# Patient Record
Sex: FEMALE | Race: WHITE | HISPANIC OR LATINO | Employment: UNEMPLOYED | URBAN - METROPOLITAN AREA
[De-identification: names, ages, dates, MRNs, and addresses within clinical notes are randomized per-mention and may not be internally consistent; named-entity substitution may affect disease eponyms.]

---

## 2018-02-04 ENCOUNTER — HOSPITAL ENCOUNTER (OUTPATIENT)
Facility: HOSPITAL | Age: 53
Discharge: HOME | End: 2018-02-05
Attending: EMERGENCY MEDICINE | Admitting: HOSPITALIST
Payer: COMMERCIAL

## 2018-02-04 ENCOUNTER — ANESTHESIA EVENT (OUTPATIENT)
Dept: ENDOSCOPY | Facility: HOSPITAL | Age: 53
End: 2018-02-04
Payer: COMMERCIAL

## 2018-02-04 ENCOUNTER — ANESTHESIA (OUTPATIENT)
Dept: ENDOSCOPY | Facility: HOSPITAL | Age: 53
End: 2018-02-04
Payer: COMMERCIAL

## 2018-02-04 ENCOUNTER — TELEPHONE (OUTPATIENT)
Dept: GASTROENTEROLOGY | Facility: HOSPITAL | Age: 53
End: 2018-02-04

## 2018-02-04 DIAGNOSIS — R07.9 CHEST PAIN: ICD-10-CM

## 2018-02-04 DIAGNOSIS — R13.10 DYSPHAGIA, UNSPECIFIED TYPE: Primary | ICD-10-CM

## 2018-02-04 DIAGNOSIS — E11.9 TYPE 2 DIABETES MELLITUS WITHOUT COMPLICATION, WITH LONG-TERM CURRENT USE OF INSULIN: ICD-10-CM

## 2018-02-04 DIAGNOSIS — I10 ESSENTIAL HYPERTENSION: ICD-10-CM

## 2018-02-04 DIAGNOSIS — R13.19 ESOPHAGEAL DYSPHAGIA: ICD-10-CM

## 2018-02-04 DIAGNOSIS — Z79.4 TYPE 2 DIABETES MELLITUS WITHOUT COMPLICATION, WITH LONG-TERM CURRENT USE OF INSULIN: ICD-10-CM

## 2018-02-04 DIAGNOSIS — E07.9 THYROID DISEASE: ICD-10-CM

## 2018-02-04 LAB
POCT GLUCOSE: 148 MG/DL (ref 70–110)
POCT GLUCOSE: 185 MG/DL (ref 70–110)
TROPONIN I SERPL DL<=0.01 NG/ML-MCNC: <0.006 NG/ML

## 2018-02-04 PROCEDURE — 93005 ELECTROCARDIOGRAM TRACING: CPT

## 2018-02-04 PROCEDURE — 84484 ASSAY OF TROPONIN QUANT: CPT

## 2018-02-04 PROCEDURE — 88305 TISSUE EXAM BY PATHOLOGIST: CPT

## 2018-02-04 PROCEDURE — 82962 GLUCOSE BLOOD TEST: CPT | Performed by: INTERNAL MEDICINE

## 2018-02-04 PROCEDURE — 37000008 HC ANESTHESIA 1ST 15 MINUTES: Performed by: INTERNAL MEDICINE

## 2018-02-04 PROCEDURE — 88342 IMHCHEM/IMCYTCHM 1ST ANTB: CPT | Mod: 26,,,

## 2018-02-04 PROCEDURE — 82962 GLUCOSE BLOOD TEST: CPT

## 2018-02-04 PROCEDURE — D9220A PRA ANESTHESIA: Mod: CRNA,,, | Performed by: NURSE ANESTHETIST, CERTIFIED REGISTERED

## 2018-02-04 PROCEDURE — 27201012 HC FORCEPS, HOT/COLD, DISP: Performed by: INTERNAL MEDICINE

## 2018-02-04 PROCEDURE — 25000003 PHARM REV CODE 250: Performed by: NURSE ANESTHETIST, CERTIFIED REGISTERED

## 2018-02-04 PROCEDURE — 93010 ELECTROCARDIOGRAM REPORT: CPT | Mod: ,,, | Performed by: INTERNAL MEDICINE

## 2018-02-04 PROCEDURE — 96372 THER/PROPH/DIAG INJ SC/IM: CPT

## 2018-02-04 PROCEDURE — 99220 PR INITIAL OBSERVATION CARE,LEVL III: CPT | Mod: ,,, | Performed by: PHYSICIAN ASSISTANT

## 2018-02-04 PROCEDURE — 37000009 HC ANESTHESIA EA ADD 15 MINS: Performed by: INTERNAL MEDICINE

## 2018-02-04 PROCEDURE — 99243 OFF/OP CNSLTJ NEW/EST LOW 30: CPT | Mod: 25,,, | Performed by: INTERNAL MEDICINE

## 2018-02-04 PROCEDURE — 63600175 PHARM REV CODE 636 W HCPCS: Performed by: NURSE ANESTHETIST, CERTIFIED REGISTERED

## 2018-02-04 PROCEDURE — G0378 HOSPITAL OBSERVATION PER HR: HCPCS

## 2018-02-04 PROCEDURE — 43239 EGD BIOPSY SINGLE/MULTIPLE: CPT | Performed by: INTERNAL MEDICINE

## 2018-02-04 PROCEDURE — 99285 EMERGENCY DEPT VISIT HI MDM: CPT | Mod: ,,, | Performed by: EMERGENCY MEDICINE

## 2018-02-04 PROCEDURE — 99285 EMERGENCY DEPT VISIT HI MDM: CPT | Mod: 25

## 2018-02-04 PROCEDURE — D9220A PRA ANESTHESIA: Mod: ANES,,, | Performed by: ANESTHESIOLOGY

## 2018-02-04 PROCEDURE — 43239 EGD BIOPSY SINGLE/MULTIPLE: CPT | Mod: 22,,, | Performed by: INTERNAL MEDICINE

## 2018-02-04 PROCEDURE — 63600175 PHARM REV CODE 636 W HCPCS: Performed by: STUDENT IN AN ORGANIZED HEALTH CARE EDUCATION/TRAINING PROGRAM

## 2018-02-04 PROCEDURE — 96374 THER/PROPH/DIAG INJ IV PUSH: CPT

## 2018-02-04 PROCEDURE — 88305 TISSUE EXAM BY PATHOLOGIST: CPT | Mod: 26,,,

## 2018-02-04 RX ORDER — LIDOCAINE HCL/PF 100 MG/5ML
SYRINGE (ML) INTRAVENOUS
Status: DISCONTINUED | OUTPATIENT
Start: 2018-02-04 | End: 2018-02-04

## 2018-02-04 RX ORDER — ONDANSETRON 2 MG/ML
4 INJECTION INTRAMUSCULAR; INTRAVENOUS ONCE
Status: COMPLETED | OUTPATIENT
Start: 2018-02-04 | End: 2018-02-04

## 2018-02-04 RX ORDER — FENTANYL CITRATE 50 UG/ML
25 INJECTION, SOLUTION INTRAMUSCULAR; INTRAVENOUS EVERY 5 MIN PRN
Status: DISCONTINUED | OUTPATIENT
Start: 2018-02-04 | End: 2018-02-05 | Stop reason: HOSPADM

## 2018-02-04 RX ORDER — GLUCAGON 1 MG
1 KIT INJECTION ONCE
Status: COMPLETED | OUTPATIENT
Start: 2018-02-04 | End: 2018-02-04

## 2018-02-04 RX ORDER — SODIUM CHLORIDE 9 MG/ML
INJECTION, SOLUTION INTRAVENOUS CONTINUOUS PRN
Status: DISCONTINUED | OUTPATIENT
Start: 2018-02-04 | End: 2018-02-04

## 2018-02-04 RX ORDER — HYOSCYAMINE SULFATE 0.12 MG/1
0.12 TABLET SUBLINGUAL
Status: COMPLETED | OUTPATIENT
Start: 2018-02-04 | End: 2018-02-04

## 2018-02-04 RX ORDER — ONDANSETRON 2 MG/ML
4 INJECTION INTRAMUSCULAR; INTRAVENOUS DAILY PRN
Status: DISCONTINUED | OUTPATIENT
Start: 2018-02-04 | End: 2018-02-05 | Stop reason: HOSPADM

## 2018-02-04 RX ORDER — SODIUM CHLORIDE 0.9 % (FLUSH) 0.9 %
3 SYRINGE (ML) INJECTION
Status: DISCONTINUED | OUTPATIENT
Start: 2018-02-05 | End: 2018-02-05 | Stop reason: HOSPADM

## 2018-02-04 RX ORDER — PROPOFOL 10 MG/ML
VIAL (ML) INTRAVENOUS
Status: DISCONTINUED | OUTPATIENT
Start: 2018-02-04 | End: 2018-02-04

## 2018-02-04 RX ADMIN — GLUCAGON 1 MG: KIT at 08:02

## 2018-02-04 RX ADMIN — LIDOCAINE HYDROCHLORIDE 100 MG: 20 INJECTION, SOLUTION INTRAVENOUS at 10:02

## 2018-02-04 RX ADMIN — ONDANSETRON 4 MG: 2 INJECTION INTRAMUSCULAR; INTRAVENOUS at 08:02

## 2018-02-04 RX ADMIN — SODIUM CHLORIDE: 0.9 INJECTION, SOLUTION INTRAVENOUS at 10:02

## 2018-02-04 RX ADMIN — HYOSCYAMINE SULFATE 0.12 MG: 0.12 TABLET ORAL; SUBLINGUAL at 08:02

## 2018-02-04 RX ADMIN — PROPOFOL 180 MG: 10 INJECTION, EMULSION INTRAVENOUS at 10:02

## 2018-02-04 NOTE — LETTER
February 5, 2018         [address]  [facility] [address]  Phone: [phone]  Fax: [phone]       Patient: Dianna Perez   YOB: 1965  Date of Visit: 02/05/2018    To Whom It May Concern:    Felix Perez was at [facility] on 02/04/2018-02/05/2018 while his wife was hospitalized.  If you have any questions or concerns, or if I can be of further assistance, please do not hesitate to contact me.    Sincerely,    Citlalli Epstein PA-C

## 2018-02-04 NOTE — ED TRIAGE NOTES
Pt arrives to the ED with CC of difficulty swallowing. Pt's grand daughter stated that the pt has had a history with this before 17 times prior to this ED visit. Pt's grand daughter stated that the pt has had endoscopy in order to re-open the esophagus. Pt denies any NVD and fever. Pt is alert and oriented and in no acute distress at this time.

## 2018-02-04 NOTE — LETTER
February 5, 2018         [address]  [facility] [address]  Phone: [phone]  Fax: [phone]       Patient: Dianna Perez   YOB: 1965  Date of Visit: 02/05/2018    To Whom It May Concern:    Felix Perez was at [facility] on 02/04/2018-02/05/2018 while his wife was hospitalized.    Sincerely,    Citlalli Epstein PA-C

## 2018-02-04 NOTE — LETTER
February 5, 2018                         [address]  [facility] [address]  Phone: [phone]  Fax: [phone]   February 5, 2018     Patient: Dianna Perez   YOB: 1965   Date of Visit: 2/4/2018       To Whom it May Concern:    Dianna Fong stayed at [facility] on 2/4/2018-2/5/2018 while her grandmother, Dianna Perez, was hospitalized.    If you have any questions or concerns, please don't hesitate to call.    Sincerely,         Citlalli Epstein PA-C

## 2018-02-04 NOTE — LETTER
February 5, 2018         [address]  [place] [address]  Phone: [phone]  Fax: [phone]       Patient: Dianna Perez   YOB: 1965  Date of Visit: 02/05/2018    To Whom It May Concern:    Dianna Fong was at [facility] on 02/04/2018-02/05/2018 while her grandmother, Dianna Perez, was hospitalized. If you have any questions or concerns, or if I can be of further assistance, please do not hesitate to contact me.    Sincerely,    Citlalli Epstein PA-C

## 2018-02-05 ENCOUNTER — TELEPHONE (OUTPATIENT)
Dept: GASTROENTEROLOGY | Facility: HOSPITAL | Age: 53
End: 2018-02-05

## 2018-02-05 VITALS
HEART RATE: 72 BPM | WEIGHT: 226 LBS | RESPIRATION RATE: 16 BRPM | BODY MASS INDEX: 38.58 KG/M2 | OXYGEN SATURATION: 95 % | DIASTOLIC BLOOD PRESSURE: 62 MMHG | SYSTOLIC BLOOD PRESSURE: 109 MMHG | HEIGHT: 64 IN | TEMPERATURE: 97 F

## 2018-02-05 PROBLEM — R13.19 ESOPHAGEAL DYSPHAGIA: Status: ACTIVE | Noted: 2018-02-05

## 2018-02-05 PROBLEM — E78.2 MIXED HYPERLIPIDEMIA: Chronic | Status: ACTIVE | Noted: 2018-02-05

## 2018-02-05 LAB
ALBUMIN SERPL BCP-MCNC: 3 G/DL
ALP SERPL-CCNC: 128 U/L
ALT SERPL W/O P-5'-P-CCNC: 54 U/L
ANION GAP SERPL CALC-SCNC: 8 MMOL/L
AST SERPL-CCNC: 41 U/L
BASOPHILS # BLD AUTO: 0.03 K/UL
BASOPHILS NFR BLD: 0.3 %
BILIRUB SERPL-MCNC: 0.8 MG/DL
BUN SERPL-MCNC: 12 MG/DL
CALCIUM SERPL-MCNC: 8.6 MG/DL
CHLORIDE SERPL-SCNC: 106 MMOL/L
CO2 SERPL-SCNC: 24 MMOL/L
CREAT SERPL-MCNC: 0.8 MG/DL
DIFFERENTIAL METHOD: NORMAL
EOSINOPHIL # BLD AUTO: 0.1 K/UL
EOSINOPHIL NFR BLD: 0.9 %
ERYTHROCYTE [DISTWIDTH] IN BLOOD BY AUTOMATED COUNT: 13.8 %
EST. GFR  (AFRICAN AMERICAN): >60 ML/MIN/1.73 M^2
EST. GFR  (NON AFRICAN AMERICAN): >60 ML/MIN/1.73 M^2
ESTIMATED AVG GLUCOSE: 237 MG/DL
GLUCOSE SERPL-MCNC: 224 MG/DL
HBA1C MFR BLD HPLC: 9.9 %
HCT VFR BLD AUTO: 39.7 %
HGB BLD-MCNC: 13 G/DL
IMM GRANULOCYTES # BLD AUTO: 0.02 K/UL
IMM GRANULOCYTES NFR BLD AUTO: 0.2 %
LYMPHOCYTES # BLD AUTO: 2.4 K/UL
LYMPHOCYTES NFR BLD: 27.9 %
MAGNESIUM SERPL-MCNC: 2 MG/DL
MCH RBC QN AUTO: 27 PG
MCHC RBC AUTO-ENTMCNC: 32.7 G/DL
MCV RBC AUTO: 82 FL
MONOCYTES # BLD AUTO: 0.4 K/UL
MONOCYTES NFR BLD: 4.5 %
NEUTROPHILS # BLD AUTO: 5.8 K/UL
NEUTROPHILS NFR BLD: 66.2 %
NRBC BLD-RTO: 0 /100 WBC
PHOSPHATE SERPL-MCNC: 3.1 MG/DL
PLATELET # BLD AUTO: 173 K/UL
PMV BLD AUTO: 12.6 FL
POCT GLUCOSE: 245 MG/DL (ref 70–110)
POCT GLUCOSE: 289 MG/DL (ref 70–110)
POTASSIUM SERPL-SCNC: 4 MMOL/L
PROT SERPL-MCNC: 6.7 G/DL
RBC # BLD AUTO: 4.82 M/UL
SODIUM SERPL-SCNC: 138 MMOL/L
WBC # BLD AUTO: 8.72 K/UL

## 2018-02-05 PROCEDURE — 25000003 PHARM REV CODE 250: Performed by: PHYSICIAN ASSISTANT

## 2018-02-05 PROCEDURE — 85025 COMPLETE CBC W/AUTO DIFF WBC: CPT

## 2018-02-05 PROCEDURE — 83735 ASSAY OF MAGNESIUM: CPT

## 2018-02-05 PROCEDURE — 36415 COLL VENOUS BLD VENIPUNCTURE: CPT

## 2018-02-05 PROCEDURE — G0378 HOSPITAL OBSERVATION PER HR: HCPCS

## 2018-02-05 PROCEDURE — 84100 ASSAY OF PHOSPHORUS: CPT

## 2018-02-05 PROCEDURE — 63600175 PHARM REV CODE 636 W HCPCS: Performed by: PHYSICIAN ASSISTANT

## 2018-02-05 PROCEDURE — 99217 PR OBSERVATION CARE DISCHARGE: CPT | Mod: ,,, | Performed by: PHYSICIAN ASSISTANT

## 2018-02-05 PROCEDURE — 25000242 PHARM REV CODE 250 ALT 637 W/ HCPCS: Performed by: PHYSICIAN ASSISTANT

## 2018-02-05 PROCEDURE — 80053 COMPREHEN METABOLIC PANEL: CPT

## 2018-02-05 PROCEDURE — 83036 HEMOGLOBIN GLYCOSYLATED A1C: CPT

## 2018-02-05 RX ORDER — SODIUM CHLORIDE 0.9 % (FLUSH) 0.9 %
5 SYRINGE (ML) INJECTION
Status: DISCONTINUED | OUTPATIENT
Start: 2018-02-05 | End: 2018-02-05 | Stop reason: HOSPADM

## 2018-02-05 RX ORDER — FAMOTIDINE 20 MG/1
20 TABLET, FILM COATED ORAL 2 TIMES DAILY
Status: DISCONTINUED | OUTPATIENT
Start: 2018-02-05 | End: 2018-02-05 | Stop reason: HOSPADM

## 2018-02-05 RX ORDER — RAMELTEON 8 MG/1
8 TABLET ORAL NIGHTLY PRN
Status: DISCONTINUED | OUTPATIENT
Start: 2018-02-05 | End: 2018-02-05 | Stop reason: HOSPADM

## 2018-02-05 RX ORDER — GLUCAGON 1 MG
1 KIT INJECTION
Status: DISCONTINUED | OUTPATIENT
Start: 2018-02-05 | End: 2018-02-05 | Stop reason: HOSPADM

## 2018-02-05 RX ORDER — HYOSCYAMINE SULFATE 0.12 MG/1
0.12 TABLET SUBLINGUAL EVERY 4 HOURS PRN
Status: DISCONTINUED | OUTPATIENT
Start: 2018-02-05 | End: 2018-02-05 | Stop reason: HOSPADM

## 2018-02-05 RX ORDER — INSULIN ASPART 100 [IU]/ML
0-5 INJECTION, SOLUTION INTRAVENOUS; SUBCUTANEOUS EVERY 6 HOURS PRN
Status: DISCONTINUED | OUTPATIENT
Start: 2018-02-05 | End: 2018-02-05 | Stop reason: HOSPADM

## 2018-02-05 RX ORDER — IBUPROFEN 200 MG
24 TABLET ORAL
Status: DISCONTINUED | OUTPATIENT
Start: 2018-02-05 | End: 2018-02-05 | Stop reason: HOSPADM

## 2018-02-05 RX ORDER — MORPHINE SULFATE 20 MG/ML
5 SOLUTION ORAL EVERY 6 HOURS PRN
Status: DISCONTINUED | OUTPATIENT
Start: 2018-02-05 | End: 2018-02-05

## 2018-02-05 RX ORDER — IPRATROPIUM BROMIDE AND ALBUTEROL SULFATE 2.5; .5 MG/3ML; MG/3ML
3 SOLUTION RESPIRATORY (INHALATION) EVERY 4 HOURS PRN
Status: DISCONTINUED | OUTPATIENT
Start: 2018-02-05 | End: 2018-02-05 | Stop reason: HOSPADM

## 2018-02-05 RX ORDER — ACETAMINOPHEN 325 MG/1
650 TABLET ORAL EVERY 4 HOURS PRN
Status: DISCONTINUED | OUTPATIENT
Start: 2018-02-05 | End: 2018-02-05 | Stop reason: HOSPADM

## 2018-02-05 RX ORDER — PANTOPRAZOLE SODIUM 40 MG/1
40 TABLET, DELAYED RELEASE ORAL DAILY
Status: DISCONTINUED | OUTPATIENT
Start: 2018-02-05 | End: 2018-02-05 | Stop reason: HOSPADM

## 2018-02-05 RX ORDER — PRAVASTATIN SODIUM 10 MG/1
10 TABLET ORAL DAILY
Status: DISCONTINUED | OUTPATIENT
Start: 2018-02-05 | End: 2018-02-05 | Stop reason: HOSPADM

## 2018-02-05 RX ORDER — CALCIUM CARBONATE 200(500)MG
1 TABLET,CHEWABLE ORAL DAILY
Status: DISCONTINUED | OUTPATIENT
Start: 2018-02-05 | End: 2018-02-05 | Stop reason: HOSPADM

## 2018-02-05 RX ORDER — CITALOPRAM 40 MG/1
40 TABLET, FILM COATED ORAL DAILY
Status: DISCONTINUED | OUTPATIENT
Start: 2018-02-05 | End: 2018-02-05 | Stop reason: HOSPADM

## 2018-02-05 RX ORDER — AMOXICILLIN 250 MG
1 CAPSULE ORAL 2 TIMES DAILY
Status: DISCONTINUED | OUTPATIENT
Start: 2018-02-05 | End: 2018-02-05 | Stop reason: HOSPADM

## 2018-02-05 RX ORDER — PROMETHAZINE HYDROCHLORIDE 25 MG/1
25 TABLET ORAL EVERY 6 HOURS PRN
Status: DISCONTINUED | OUTPATIENT
Start: 2018-02-05 | End: 2018-02-05 | Stop reason: HOSPADM

## 2018-02-05 RX ORDER — MORPHINE SULFATE ORAL SOLUTION 10 MG/5ML
5 SOLUTION ORAL EVERY 6 HOURS PRN
Status: DISCONTINUED | OUTPATIENT
Start: 2018-02-05 | End: 2018-02-05 | Stop reason: HOSPADM

## 2018-02-05 RX ORDER — IBUPROFEN 200 MG
16 TABLET ORAL
Status: DISCONTINUED | OUTPATIENT
Start: 2018-02-05 | End: 2018-02-05 | Stop reason: HOSPADM

## 2018-02-05 RX ORDER — LISINOPRIL AND HYDROCHLOROTHIAZIDE 10; 12.5 MG/1; MG/1
1 TABLET ORAL DAILY
Status: DISCONTINUED | OUTPATIENT
Start: 2018-02-05 | End: 2018-02-05 | Stop reason: HOSPADM

## 2018-02-05 RX ORDER — FLUTICASONE PROPIONATE 50 MCG
1 SPRAY, SUSPENSION (ML) NASAL DAILY
Status: DISCONTINUED | OUTPATIENT
Start: 2018-02-05 | End: 2018-02-05 | Stop reason: HOSPADM

## 2018-02-05 RX ORDER — LEVOTHYROXINE SODIUM 25 UG/1
25 TABLET ORAL DAILY
Status: DISCONTINUED | OUTPATIENT
Start: 2018-02-05 | End: 2018-02-05 | Stop reason: HOSPADM

## 2018-02-05 RX ORDER — ONDANSETRON 2 MG/ML
4 INJECTION INTRAMUSCULAR; INTRAVENOUS EVERY 8 HOURS PRN
Status: DISCONTINUED | OUTPATIENT
Start: 2018-02-05 | End: 2018-02-05 | Stop reason: HOSPADM

## 2018-02-05 RX ADMIN — STANDARDIZED SENNA CONCENTRATE AND DOCUSATE SODIUM 1 TABLET: 8.6; 5 TABLET, FILM COATED ORAL at 08:02

## 2018-02-05 RX ADMIN — LISINOPRIL AND HYDROCHLOROTHIAZIDE 1 TABLET: 12.5; 1 TABLET ORAL at 08:02

## 2018-02-05 RX ADMIN — INSULIN ASPART 2 UNITS: 100 INJECTION, SOLUTION INTRAVENOUS; SUBCUTANEOUS at 08:02

## 2018-02-05 RX ADMIN — PRAVASTATIN SODIUM 10 MG: 10 TABLET ORAL at 08:02

## 2018-02-05 RX ADMIN — CITALOPRAM HYDROBROMIDE 40 MG: 40 TABLET ORAL at 08:02

## 2018-02-05 RX ADMIN — CALCIUM CARBONATE (ANTACID) CHEW TAB 500 MG 500 MG: 500 CHEW TAB at 08:02

## 2018-02-05 RX ADMIN — MORPHINE SULFATE 5 MG: 10 SOLUTION ORAL at 01:02

## 2018-02-05 RX ADMIN — MORPHINE SULFATE 5 MG: 10 SOLUTION ORAL at 07:02

## 2018-02-05 RX ADMIN — FLUTICASONE PROPIONATE 50 MCG: 50 SPRAY, METERED NASAL at 08:02

## 2018-02-05 RX ADMIN — LEVOTHYROXINE SODIUM 25 MCG: 25 TABLET ORAL at 08:02

## 2018-02-05 RX ADMIN — FAMOTIDINE 20 MG: 20 TABLET, FILM COATED ORAL at 08:02

## 2018-02-05 RX ADMIN — PANTOPRAZOLE SODIUM 40 MG: 40 TABLET, DELAYED RELEASE ORAL at 08:02

## 2018-02-05 RX ADMIN — ACETAMINOPHEN 650 MG: 325 TABLET, FILM COATED ORAL at 01:02

## 2018-02-05 RX ADMIN — INSULIN ASPART 3 UNITS: 100 INJECTION, SOLUTION INTRAVENOUS; SUBCUTANEOUS at 01:02

## 2018-02-05 NOTE — SUBJECTIVE & OBJECTIVE
Past Medical History:   Diagnosis Date    Asthma     Diabetes mellitus     Hypertension     Migraine headache     Stroke     Thyroid disease        Past Surgical History:   Procedure Laterality Date    APPENDECTOMY       SECTION      CHOLECYSTECTOMY      HYSTERECTOMY      UMBILICAL HERNIA REPAIR         Review of patient's allergies indicates:   Allergen Reactions    Diazepam Hallucinations       Current Facility-Administered Medications on File Prior to Encounter   Medication    [COMPLETED] GI cocktail (mylanta 30 mL, lidocaine 2 % viscous 10 mL, dicyclomine 10 mL) 50 mL    [COMPLETED] glucagon (human recombinant) injection 1 mg    [COMPLETED] ondansetron injection 4 mg     Current Outpatient Prescriptions on File Prior to Encounter   Medication Sig    lisinopril-hydrochlorothiazide (PRINZIDE,ZESTORETIC) 10-12.5 mg per tablet Take 1 tablet by mouth once daily.    ranitidine (ZANTAC) 150 MG tablet Take 150 mg by mouth 2 (two) times daily.    calcium carbonate (TUMS) 200 mg calcium (500 mg) chewable tablet Take 1 tablet by mouth once daily.    citalopram (CELEXA) 40 MG tablet Take 40 mg by mouth once daily.    codeine-butalbital-ASA-caffeine (BUTALBITAL COMPOUND-CODEINE) 22--40 mg Cap Take 1 capsule by mouth every 4 (four) hours as needed.    esomeprazole (NEXIUM) 20 MG capsule Take 20 mg by mouth before breakfast.    fluticasone (FLONASE) 50 mcg/actuation nasal spray 1 spray by Each Nare route once daily.    glyBURIDE (DIABETA) 5 MG tablet Take 5 mg by mouth daily with breakfast.    hyoscyamine (LEVSIN/SL) 0.125 mg Subl Place 0.125 mg under the tongue every 4 (four) hours as needed.    levothyroxine (SYNTHROID) 25 MCG tablet Take 25 mcg by mouth once daily.    metFORMIN (GLUCOPHAGE) 850 MG tablet Take 850 mg by mouth 2 (two) times daily with meals.    pantoprazole (PROTONIX) 40 MG tablet Take 40 mg by mouth once daily.    pravastatin (PRAVACHOL) 10 MG tablet Take 10 mg by  mouth once daily.    prochlorperazine (COMPAZINE) 10 MG tablet Take 1 tablet (10 mg total) by mouth 3 (three) times daily as needed.     Family History     None        Social History Main Topics    Smoking status: Never Smoker    Smokeless tobacco: Never Used    Alcohol use No    Drug use: No    Sexual activity: No     Review of Systems   Constitutional: Negative for activity change, appetite change, chills, fatigue, fever and unexpected weight change.   HENT: Positive for sore throat and trouble swallowing. Negative for congestion, rhinorrhea and voice change.    Eyes: Negative for visual disturbance.   Respiratory: Negative for cough, choking, chest tightness, shortness of breath and wheezing.    Cardiovascular: Negative for chest pain, palpitations and leg swelling.   Gastrointestinal: Negative for abdominal distention, abdominal pain, anal bleeding, blood in stool, constipation, diarrhea, nausea and vomiting.   Endocrine: Negative for cold intolerance, heat intolerance, polydipsia and polyuria.   Genitourinary: Negative for dysuria, flank pain, frequency, hematuria and urgency.   Musculoskeletal: Negative for arthralgias, back pain, joint swelling and myalgias.   Skin: Negative for color change and rash.   Neurological: Negative for dizziness, seizures, syncope, facial asymmetry, speech difficulty, weakness, light-headedness, numbness and headaches.   Hematological: Negative for adenopathy. Does not bruise/bleed easily.   Psychiatric/Behavioral: Negative for agitation, confusion, hallucinations and suicidal ideas.     Objective:     Vital Signs (Most Recent):  Temp: 97.2 °F (36.2 °C) (02/05/18 0027)  Pulse: 77 (02/05/18 0027)  Resp: 16 (02/05/18 0027)  BP: 135/67 (02/05/18 0027)  SpO2: 95 % (02/05/18 0027) Vital Signs (24h Range):  Temp:  [97.2 °F (36.2 °C)-98.6 °F (37 °C)] 97.2 °F (36.2 °C)  Pulse:  [] 77  Resp:  [16-20] 16  SpO2:  [94 %-99 %] 95 %  BP: (133-168)/() 135/67     Weight: 102.5 kg  (226 lb)  Body mass index is 38.79 kg/m².    Physical Exam   Constitutional: She is oriented to person, place, and time. She appears well-developed and well-nourished. No distress.   HENT:   Head: Normocephalic and atraumatic.   Neck: Neck supple. Carotid bruit is not present. No thyromegaly present.   Cardiovascular: Normal rate and regular rhythm.  Exam reveals no gallop.    No murmur heard.  Pulmonary/Chest: Effort normal and breath sounds normal. No respiratory distress. She has no wheezes.   Abdominal: Soft. Bowel sounds are normal. She exhibits no distension and no mass. There is no splenomegaly or hepatomegaly. There is no tenderness.   Musculoskeletal: Normal range of motion. She exhibits no edema or deformity.   Neurological: She is alert and oriented to person, place, and time. No cranial nerve deficit or sensory deficit.   Skin: Skin is warm and dry. No rash noted.   Psychiatric: She has a normal mood and affect.           Significant Labs:   CBC:   Recent Labs  Lab 02/04/18  1249   WBC 7.26   HGB 14.7   HCT 43.8        CMP:   Recent Labs  Lab 02/04/18  1249      K 3.9      CO2 29   *   BUN 12   CREATININE 0.66   CALCIUM 9.3   PROT 7.8   ALBUMIN 4.3   BILITOT 0.5   ALKPHOS 176*   AST 82*   ALT 98*   ANIONGAP 8   EGFRNONAA >60.0       Significant Imaging: I have reviewed all pertinent imaging results/findings within the past 24 hours.

## 2018-02-05 NOTE — ANESTHESIA PREPROCEDURE EVALUATION
2018  Dianna Perez is a 52 y.o., female with PMHx of HTN, DMII, hypothyroidism and history of multiple esophageal dilations presents for repeat scope due to inability to tolerate liquids or solids.    Pre-operative evaluation for ESOPHAGOGASTRODUODENOSCOPY (EGD) (N/A)    LDA:  18G L AC    Past Surgical History:   Procedure Laterality Date    APPENDECTOMY       SECTION      CHOLECYSTECTOMY      HYSTERECTOMY      UMBILICAL HERNIA REPAIR           Vital Signs Range (Last 24H):  Temp:  [36.7 °C (98 °F)-37 °C (98.6 °F)]   Pulse:  [71-96]   Resp:  [16-20]   BP: (133-168)/()   SpO2:  [96 %-99 %]       CBC:     Recent Labs  Lab 18  1249   WBC 7.26   RBC 5.46*   HGB 14.7   HCT 43.8      MCV 80*   MCH 26.9*   MCHC 33.6       CMP:   Recent Labs  Lab 18  1249      K 3.9      CO2 29   BUN 12   CREATININE 0.66   *   CALCIUM 9.3   ALBUMIN 4.3   PROT 7.8   ALKPHOS 176*   ALT 98*   AST 82*   BILITOT 0.5       INR:    Recent Labs  Lab 18  1249   INR 1.0   APTT 31.6     Anesthesia Evaluation    I have reviewed the Patient Summary Reports.     I have reviewed the Medications.     Review of Systems  Anesthesia Hx:  No problems with previous Anesthesia    Social:  Non-Smoker    Cardiovascular:   Hypertension Denies MI.  Denies CAD.    Denies CABG/stent.     Pulmonary:   Denies Pneumonia Denies COPD.    Renal/:  Renal/ Normal     Hepatic/GI:  Hepatic/GI Normal    Neurological:   CVA (residual L sided weakness), residual symptoms Denies Seizures.    Endocrine:   Diabetes        Physical Exam  General:  Well nourished, Obesity    Airway/Jaw/Neck:  Airway Findings: Mouth Opening: Normal Tongue: Large  Mallampati: III  Improves to III with phonation.  TM Distance: Normal, at least 6 cm  Jaw/Neck Findings:  Neck ROM: Normal Extension, Painful      Eyes/Ears/Nose:  EYES/EARS/NOSE FINDINGS: Normal   Dental:  Dental Findings: upper partial dentures   Chest/Lungs:  Chest/Lungs Findings: Normal Respiratory Rate     Heart/Vascular:  Heart Findings: Rate: Normal  Rhythm: Regular Rhythm        Mental Status:  Mental Status Findings: Normal        Anesthesia Plan  Type of Anesthesia, risks & benefits discussed:  Anesthesia Type:  general  Patient's Preference:   Intra-op Monitoring Plan: standard ASA monitors  Intra-op Monitoring Plan Comments:   Post Op Pain Control Plan:   Post Op Pain Control Plan Comments:   Induction:   IV  Beta Blocker:  Patient is not currently on a Beta-Blocker (No further documentation required).       Informed Consent: Patient understands risks and agrees with Anesthesia plan.  Questions answered. Anesthesia consent signed with patient.  ASA Score: 3     Day of Surgery Review of History & Physical:    H&P update referred to the surgeon.         Ready For Surgery From Anesthesia Perspective.

## 2018-02-05 NOTE — ASSESSMENT & PLAN NOTE
- Patient s/p EGD.  Procedure noted normal examined duodenum, eythematous mucosa in the gastric fundus, gastric body, antrum, prepyloric region of the stomach-biopsied, 3 cm hiatal hernia, and tortuous esophagus. One gastric polyp-biopsied.  - FLD per GI recommendations.  They will preform esophagram with 13mm barium table tomorrow.  - Will need follow-up in GI clinic in two weeks.  - Supportive care, aspiration precautions.

## 2018-02-05 NOTE — ASSESSMENT & PLAN NOTE
52 year old female with a history of HTN, Hypothyroidism, T2DM, and Asthma who was transferred from an outside hospital due to ongoing dysphagia. GI consulted to evaluate dysphagia and need for scope.    Patient reports symptom onset as last night, however delayed care as she was hopeful that symptoms would pass. Unsure if there is still a food bolus as VS are stable and she isn't having excessive secretions. However at OSH unable to pass a liquid channel and here with simply asking her to swallow saliva she started coughing/dry heaving (no emesis). A second dose of glucagon was give at 8:15 pm with no improvement in her symptoms. She overall feels like she is getting worse as due to the prolonged duration she now feels weak and has a headache.    Recommendations:  -NPO with IVF for EGD today further recs after EGD

## 2018-02-05 NOTE — H&P
[facility] Hospital Medicine  History & Physical    Patient Name: Dianna Perez  MRN: [id number]  Admission Date: 2018  Attending Physician: Fernando Cordova MD   Primary Care Provider: Primary Doctor [facility] Medicine Team: [facility] MED F Citlalli Epstein PA-C     Patient information was obtained from patient, relative(s) and ER records.     Subjective:     Principal Problem:Esophageal dysphagia    Chief Complaint:   Chief Complaint   Patient presents with    swallow problem     tx from [place].  Pt has difficulty swallowing since last night.  Here for ENT consult. Martiniquais Speaking.         HPI: Patient is a 52 year old lady with a h/o DM II, HTN, HLD, CVA, migraines, and multiple esophageal dilations.  She presents with difficulty swallowing.  This has gotten worse throughout the day.  She was seen at an OSH and transferred her for GI consult/EGD.  Patient first noticed symptoms yesterday at 19:00 after eating beef and beans.  She began having substernal burning, belching, and sensation of food bolus in her mid-esophagus.  She was unable to tolerate fluids.  She received Zofran, glucagon, and GI cocktail at OSH prior to transfer.  SHe was seen by GI, who took her for an EGD.  Patient currently complains of throat pain.  Denies chest pain, SOB, dizziness, palpitations, fever/chills, N/V.  She is able to swallow her secretions.  Granddaughter at bedside assists with translation, as patient is Martiniquais speaking.    Past Medical History:   Diagnosis Date    Asthma     Diabetes mellitus     Hypertension     Migraine headache     Stroke     Thyroid disease        Past Surgical History:   Procedure Laterality Date    APPENDECTOMY       SECTION      CHOLECYSTECTOMY      HYSTERECTOMY      UMBILICAL HERNIA REPAIR         Review of patient's allergies indicates:   Allergen Reactions    Diazepam Hallucinations       Current Facility-Administered Medications on  File Prior to Encounter   Medication    [COMPLETED] GI cocktail (mylanta 30 mL, lidocaine 2 % viscous 10 mL, dicyclomine 10 mL) 50 mL    [COMPLETED] glucagon (human recombinant) injection 1 mg    [COMPLETED] ondansetron injection 4 mg     Current Outpatient Prescriptions on File Prior to Encounter   Medication Sig    lisinopril-hydrochlorothiazide (PRINZIDE,ZESTORETIC) 10-12.5 mg per tablet Take 1 tablet by mouth once daily.    ranitidine (ZANTAC) 150 MG tablet Take 150 mg by mouth 2 (two) times daily.    calcium carbonate (TUMS) 200 mg calcium (500 mg) chewable tablet Take 1 tablet by mouth once daily.    citalopram (CELEXA) 40 MG tablet Take 40 mg by mouth once daily.    codeine-butalbital-ASA-caffeine (BUTALBITAL COMPOUND-CODEINE) 86--40 mg Cap Take 1 capsule by mouth every 4 (four) hours as needed.    esomeprazole (NEXIUM) 20 MG capsule Take 20 mg by mouth before breakfast.    fluticasone (FLONASE) 50 mcg/actuation nasal spray 1 spray by Each Nare route once daily.    glyBURIDE (DIABETA) 5 MG tablet Take 5 mg by mouth daily with breakfast.    hyoscyamine (LEVSIN/SL) 0.125 mg Subl Place 0.125 mg under the tongue every 4 (four) hours as needed.    levothyroxine (SYNTHROID) 25 MCG tablet Take 25 mcg by mouth once daily.    metFORMIN (GLUCOPHAGE) 850 MG tablet Take 850 mg by mouth 2 (two) times daily with meals.    pantoprazole (PROTONIX) 40 MG tablet Take 40 mg by mouth once daily.    pravastatin (PRAVACHOL) 10 MG tablet Take 10 mg by mouth once daily.    prochlorperazine (COMPAZINE) 10 MG tablet Take 1 tablet (10 mg total) by mouth 3 (three) times daily as needed.     Family History     None        Social History Main Topics    Smoking status: Never Smoker    Smokeless tobacco: Never Used    Alcohol use No    Drug use: No    Sexual activity: No     Review of Systems   Constitutional: Negative for activity change, appetite change, chills, fatigue, fever and unexpected weight change.    HENT: Positive for sore throat and trouble swallowing. Negative for congestion, rhinorrhea and voice change.    Eyes: Negative for visual disturbance.   Respiratory: Negative for cough, choking, chest tightness, shortness of breath and wheezing.    Cardiovascular: Negative for chest pain, palpitations and leg swelling.   Gastrointestinal: Negative for abdominal distention, abdominal pain, anal bleeding, blood in stool, constipation, diarrhea, nausea and vomiting.   Endocrine: Negative for cold intolerance, heat intolerance, polydipsia and polyuria.   Genitourinary: Negative for dysuria, flank pain, frequency, hematuria and urgency.   Musculoskeletal: Negative for arthralgias, back pain, joint swelling and myalgias.   Skin: Negative for color change and rash.   Neurological: Negative for dizziness, seizures, syncope, facial asymmetry, speech difficulty, weakness, light-headedness, numbness and headaches.   Hematological: Negative for adenopathy. Does not bruise/bleed easily.   Psychiatric/Behavioral: Negative for agitation, confusion, hallucinations and suicidal ideas.     Objective:     Vital Signs (Most Recent):  Temp: 97.2 °F (36.2 °C) (02/05/18 0027)  Pulse: 77 (02/05/18 0027)  Resp: 16 (02/05/18 0027)  BP: 135/67 (02/05/18 0027)  SpO2: 95 % (02/05/18 0027) Vital Signs (24h Range):  Temp:  [97.2 °F (36.2 °C)-98.6 °F (37 °C)] 97.2 °F (36.2 °C)  Pulse:  [] 77  Resp:  [16-20] 16  SpO2:  [94 %-99 %] 95 %  BP: (133-168)/() 135/67     Weight: 102.5 kg (226 lb)  Body mass index is 38.79 kg/m².    Physical Exam   Constitutional: She is oriented to person, place, and time. She appears well-developed and well-nourished. No distress.   HENT:   Head: Normocephalic and atraumatic.   Neck: Neck supple. Carotid bruit is not present. No thyromegaly present.   Cardiovascular: Normal rate and regular rhythm.  Exam reveals no gallop.    No murmur heard.  Pulmonary/Chest: Effort normal and breath sounds normal. No  respiratory distress. She has no wheezes.   Abdominal: Soft. Bowel sounds are normal. She exhibits no distension and no mass. There is no splenomegaly or hepatomegaly. There is no tenderness.   Musculoskeletal: Normal range of motion. She exhibits no edema or deformity.   Neurological: She is alert and oriented to person, place, and time. No cranial nerve deficit or sensory deficit.   Skin: Skin is warm and dry. No rash noted.   Psychiatric: She has a normal mood and affect.           Significant Labs:   CBC:   Recent Labs  Lab 02/04/18  1249   WBC 7.26   HGB 14.7   HCT 43.8        CMP:   Recent Labs  Lab 02/04/18  1249      K 3.9      CO2 29   *   BUN 12   CREATININE 0.66   CALCIUM 9.3   PROT 7.8   ALBUMIN 4.3   BILITOT 0.5   ALKPHOS 176*   AST 82*   ALT 98*   ANIONGAP 8   EGFRNONAA >60.0       Significant Imaging: I have reviewed all pertinent imaging results/findings within the past 24 hours.    Assessment/Plan:     * Esophageal dysphagia    - Patient s/p EGD.  Procedure noted normal examined duodenum, eythematous mucosa in the gastric fundus, gastric body, antrum, prepyloric region of the stomach-biopsied, 3 cm hiatal hernia, and tortuous esophagus. One gastric polyp-biopsied.  - FLD per GI recommendations.  They will preform esophagram with 13mm barium table tomorrow.  - Will need follow-up in GI clinic in two weeks.  - Supportive care, aspiration precautions.        Mixed hyperlipidemia    - Continue pravastatin 40mg daily.          Type 2 diabetes mellitus without complication, without long-term current use of insulin    - Hold metformin, glyburide.  - NPO SSI.          Hypertension    - Continue lisinopril HCTZ 10-12.5mg daily.          Acquired hypothyroidism    - Continue levothyroxine 25mcg daily.            VTE Risk Mitigation         Ordered     Medium Risk of VTE  Once      02/05/18 0038     Place sequential compression device  Until discontinued      02/05/18 0038     Place  CAROLE hose  Until discontinued      02/05/18 0038             Citlalli Epstein PA-C  Department of Hospital Medicine   [facility]

## 2018-02-05 NOTE — TRANSFER OF CARE
"Anesthesia Transfer of Care Note    Patient: Dianna Perez    Procedure(s) Performed: Procedure(s) (LRB):  ESOPHAGOGASTRODUODENOSCOPY (EGD) (N/A)    Patient location: PACU    Anesthesia Type: general    Transport from OR: Transported from OR on 6-10 L/min O2 by face mask with adequate spontaneous ventilation    Post pain: adequate analgesia    Post assessment: no apparent anesthetic complications    Post vital signs: stable    Level of consciousness: awake    Nausea/Vomiting: no nausea/vomiting    Complications: none          Last vitals:   Visit Vitals  BP (!) 145/78   Pulse 103   Temp 36.7 °C (98 °F) (Oral)   Resp 20   Ht 5' 4" (1.626 m)   Wt 102.5 kg (226 lb)   LMP  (Exact Date)   SpO2 (!) 94%   Breastfeeding? No   BMI 38.79 kg/m²     "

## 2018-02-05 NOTE — PLAN OF CARE
02/05/18 1040   Discharge Assessment   Assessment Type Discharge Planning Assessment   Confirmed/corrected address and phone number on facesheet? Yes   Assessment information obtained from? Patient   Expected Length of Stay (days) 1   Communicated expected length of stay with patient/caregiver yes   Prior to hospitilization cognitive status: Alert/Oriented   Prior to hospitalization functional status: Independent   Current cognitive status: Alert/Oriented   Current Functional Status: Independent   Lives With spouse;grandchild(peggy)  (spouse, Felix Perez ([phone]), & granddaughter, Dianna Fong ([phone]))   Able to Return to Prior Arrangements yes   Is patient able to care for self after discharge? Yes   Patient's perception of discharge disposition home or selfcare   Readmission Within The Last 30 Days no previous admission in last 30 days   Patient currently being followed by outpatient case management? No   Patient currently receives any other outside agency services? No   Equipment Currently Used at Home glucometer   Do you have any problems affording any of your prescribed medications? No   Is the patient taking medications as prescribed? yes   Does the patient have transportation home? Yes   Transportation Available family or friend will provide   Does the patient receive services at the Coumadin Clinic? No   Discharge Plan A Home with family   Discharge Plan B Home Health   Patient/Family In Agreement With Plan yes     Patient resting quietly in bed with spouse, Felix Perez, & granddaughter, Dianna Fong, at the bedside when CM rounded. Patient was admitted with esophageal dysphagia. GI consult noted. Patient had an EGD done & is scheduled to have an esophagram done today. Patient lives with Frantz & is independent of all ADLs. Plan to discharge patient home with support or home with home health when medically stable. Felix stated that he will provide transportation at time of  discharge. Patient does not have a PCP & requested assistance getting established with a PCP at [facility] follow up appointment & appointment to establish care with a new PCP scheduled for the patient with Dr. Zara Arteaga on 2/19/18 at 1300. Will continue to follow.

## 2018-02-05 NOTE — HPI
"52 year old female with a history of HTN, Hypothyroidism, T2DM, and Asthma who was transferred from an outside hospital due to ongoing dysphagia. GI consulted to evaluate dysphagia and need for scope.    Yesterday at 7 pm patient has beef and beans (she quantifies it as a hand full). After the meal she began to complain of ongoing substernal burning, bleching, sensation that food was stuck in her mid-esophagus, and inability to tolerate fluids (cannot even drink a glass of water without the water coming right up). Between 7pm-12 am symptoms continued to worsen but she decided to wait because she thought she would improve. Throughout the night she barely slept and basically sent her night sitting up. By the mid-day she decided to present to the Ed.     In the ED at the OSH the following is reported:  "Patient with retching and inability to tolerate swallowing anything.  Feels just like her previous events that have required dilation.  We tried Zofran, glucagon, GI cocktail but she still feels the GI cocktail is getting stuck midesophagus.  Labs show unremarkable CBC, mild transaminitis of unclear etiology given no acute right upper quadrant pain (some ongoing discomfort).  Chest x-ray pending.  Given that we do not have ability to do formal swallow evaluations here during the weekend with the ability to obtain a GI consult and any time, likely will require transfer for further evaluation."    Upon speaking to her in the ED she feels that she continues to worsen. She feels that her substernal burning, bleching, and inability to tolerate liquids has been presented for a prolonged time that she now reports a headache as well as generalized fatigue.    Throughout the entire interview patient continued to belch and when asked to swallow some of her own saliva she started to cough/dry heaving (no emesis).    Prior GI procedures:  Records not available however patient reports 17 prior dilations  "

## 2018-02-05 NOTE — SUBJECTIVE & OBJECTIVE
Past Medical History:   Diagnosis Date    Asthma     Diabetes mellitus     Hypertension     Migraine headache     Stroke     Thyroid disease        Past Surgical History:   Procedure Laterality Date    APPENDECTOMY       SECTION      CHOLECYSTECTOMY      HYSTERECTOMY      UMBILICAL HERNIA REPAIR         Review of patient's allergies indicates:   Allergen Reactions    Diazepam Hallucinations     Family History     None        Social History Main Topics    Smoking status: Never Smoker    Smokeless tobacco: Never Used    Alcohol use No    Drug use: No    Sexual activity: No     Review of Systems   Constitutional: Positive for fatigue. Negative for activity change, appetite change, chills, diaphoresis, fever and unexpected weight change.   HENT: Negative for trouble swallowing.    Respiratory: Negative for cough, chest tightness and shortness of breath.    Cardiovascular: Negative for chest pain and palpitations.   Gastrointestinal: Positive for nausea. Negative for abdominal distention, abdominal pain, anal bleeding, blood in stool, constipation, diarrhea, rectal pain and vomiting.   Genitourinary: Negative.    Musculoskeletal: Negative.    Neurological: Positive for weakness and headaches. Negative for dizziness, seizures and numbness.     Objective:     Vital Signs (Most Recent):  Temp: 98 °F (36.7 °C) (18)  Pulse: 79 (18)  Resp: 20 (18 180)  BP: 134/78 (18)  SpO2: 97 % (18) Vital Signs (24h Range):  Temp:  [98 °F (36.7 °C)-98.6 °F (37 °C)] 98 °F (36.7 °C)  Pulse:  [71-96] 79  Resp:  [16-20] 20  SpO2:  [96 %-99 %] 97 %  BP: (133-168)/() 134/78     Weight: 102.5 kg (226 lb) (18)  Body mass index is 38.79 kg/m².    No intake or output data in the 24 hours ending 18    Lines/Drains/Airways     Peripheral Intravenous Line                 Peripheral IV - Single Lumen 18 1249 Left Antecubital less than 1 day                 Physical Exam   Constitutional: She is oriented to person, place, and time. No distress.   HENT:   Head: Normocephalic and atraumatic.   Eyes: No scleral icterus.   Neck: Normal range of motion.   Cardiovascular: Normal rate and regular rhythm.    Pulmonary/Chest: Effort normal and breath sounds normal.   Sating well (97%) on room air, no anterior chest crepitus   Abdominal: Soft. Bowel sounds are normal. She exhibits no distension and no mass. There is no tenderness. There is no rebound and no guarding. No hernia.   Frequent belching but no evidence of excessive salivation   Musculoskeletal: Normal range of motion. She exhibits no edema.   Neurological: She is alert and oriented to person, place, and time.   Skin: She is not diaphoretic.       Significant Labs:  CBC:   Recent Labs  Lab 02/04/18  1249   WBC 7.26   HGB 14.7   HCT 43.8        CMP:   Recent Labs  Lab 02/04/18  1249   *   CALCIUM 9.3   ALBUMIN 4.3   PROT 7.8      K 3.9   CO2 29      BUN 12   CREATININE 0.66   ALKPHOS 176*   ALT 98*   AST 82*   BILITOT 0.5     Coagulation:   Recent Labs  Lab 02/04/18  1249   INR 1.0   APTT 31.6       Significant Imaging:  Imaging results within the past 24 hours have been reviewed.

## 2018-02-05 NOTE — ED PROVIDER NOTES
Encounter Date: 2018    SCRIBE #1 NOTE: I, Fernando Naranjo, am scribing for, and in the presence of,  Dr. Vincent. I have scribed the following portions of the note - Other sections scribed: parts of the MDM.       History     Chief Complaint   Patient presents with    swallow problem     tx from [place].  Pt has difficulty swallowing since last night.  Here for ENT consult. Egyptian Speaking.      Ms. Perez is a 51 yo Swedish speaking F w/ hx of DM, HTN, thyroid disease, hiatal hernia, schatski rings s/p esophageal dilation, presenting to ED as transfer from outside facility for GI consult and possible EGD. Patient states that she has been having nausea, retching, reflux, sensation of something stuck in her throat/mid esophagus with dull, pressure like pain since midnight, has not been able to tolerate PO intake at home or at outside hospital, states that she was given a GI cocktail for symptoms and started to retch and throw it up. States that this is similar, worse than when she required an esophageal dilation in the past.  Denies fever, chills, diarrhea, sick contacts, recent trauma. Endorses some shortness of breath when the pain is intense. States that she has been handling her secretions.           Review of patient's allergies indicates:   Allergen Reactions    Diazepam Hallucinations     Past Medical History:   Diagnosis Date    Asthma     Diabetes mellitus     Hypertension     Migraine headache     Stroke     Thyroid disease      Past Surgical History:   Procedure Laterality Date    APPENDECTOMY       SECTION      CHOLECYSTECTOMY      HYSTERECTOMY      UMBILICAL HERNIA REPAIR       History reviewed. No pertinent family history.  Social History   Substance Use Topics    Smoking status: Never Smoker    Smokeless tobacco: Never Used    Alcohol use No     Review of Systems   Constitutional: Negative for chills and fever.   HENT: Positive for trouble swallowing. Negative for  congestion, facial swelling, sinus pain and sinus pressure.    Eyes: Negative for photophobia and visual disturbance.   Respiratory: Positive for shortness of breath. Negative for wheezing.    Cardiovascular: Positive for chest pain. Negative for palpitations.   Gastrointestinal: Positive for abdominal pain, nausea and vomiting. Negative for abdominal distention and diarrhea.   Genitourinary: Negative for dysuria and flank pain.   Musculoskeletal: Positive for neck pain. Negative for back pain.   Skin: Negative for rash and wound.   Neurological: Negative for dizziness, tremors, syncope, facial asymmetry, speech difficulty, weakness, light-headedness, numbness and headaches.       Physical Exam     Initial Vitals [02/04/18 1709]   BP Pulse Resp Temp SpO2   136/68 86 18 98 °F (36.7 °C) 98 %      MAP       90.67         Physical Exam    Nursing note and vitals reviewed.  Constitutional: She appears well-developed and well-nourished. She is not diaphoretic. She is cooperative.  Non-toxic appearance. She does not have a sickly appearance. She does not appear ill. She appears distressed (mild distress secondary to pain and discomfort).   HENT:   Head: Normocephalic and atraumatic.   Mouth/Throat: Oropharynx is clear and moist. No oral lesions. No trismus in the jaw. No uvula swelling. No oropharyngeal exudate or tonsillar abscesses.   Eyes: EOM are normal. Pupils are equal, round, and reactive to light. No scleral icterus.   Neck: Normal range of motion and phonation normal. No stridor present. No spinous process tenderness and no muscular tenderness present. No tracheal deviation and normal range of motion present. No neck rigidity.       Cardiovascular: Normal rate, regular rhythm, normal heart sounds and intact distal pulses. Exam reveals no gallop and no friction rub.    No murmur heard.  Pulmonary/Chest: Breath sounds normal. No stridor. No respiratory distress. She has no wheezes. She has no rhonchi. She has no  rales. She exhibits tenderness.   Abdominal: Soft. She exhibits no distension. There is tenderness in the epigastric area. There is no rigidity, no guarding, no CVA tenderness, no tenderness at McBurney's point and negative Vogel's sign.   Musculoskeletal: Normal range of motion. She exhibits no edema or tenderness.   Lymphadenopathy:     She has no cervical adenopathy.   Neurological: She is alert and oriented to person, place, and time. She has normal strength. No sensory deficit. GCS eye subscore is 4. GCS verbal subscore is 5. GCS motor subscore is 6.   Skin: Skin is warm and dry. No erythema.         ED Course   Procedures  Labs Reviewed   TROPONIN I        HO2  53 yo F w/ hx of schatski rings and esophageal dilation, presenting to ED w/ similar complaint, transferred for GI consult and possible EGD - has been having worsening trouble swallowing since midnight, now unable to tolerate PO intake at home or hospital - given GI cocktail at outside facility with retching and vomiting.   On physical exam patient seems uncomfortable, chest and neck soreness to palpation, no oral swelling or lesions noted, patient speaking in full sentences without phonation difficulty, is handling her own secretions currently.   Concern for esophageal rings, strictures, gerd, infectious process.   Will consult GI for further evaluation and possible EGD.   Yves Alfred MD PGY2  02/04/2018 6:45 PM    HO2  GI to attempt glucagon and hyoscyamine. Possible EGD if this does not relieve symptoms, possible that patient has ongoing food impaction.   Yves Alfred MD PGY2  02/04/2018 8:23 PM    HO2  Patient without improvement following medication administration, to be taken for EGD.   Yves Alfred MD PGY2  02/04/2018 8:36 PM    HO3 Update:  Patient to EGD for dilation.  Admitted to medicine for post-procedural observation.     DEANGELO Pineda MD  PGY-3, [employer] EM  2/4/2018 10:26 PM           Medical Decision Making:    History:   Old Medical Records: I decided to obtain old medical records.  Other:   I have discussed this case with another health care provider.            Scribe Attestation:   Scribe #1: I performed the above scribed service and the documentation accurately describes the services I performed. I attest to the accuracy of the note.    Attending Attestation:   Physician Attestation Statement for Resident:  As the supervising MD   Physician Attestation Statement: I have personally seen and examined this patient.   I agree with the above history. -:   As the supervising MD I agree with the above PE.    As the supervising MD I agree with the above treatment, course, plan, and disposition.                    ED Course      Clinical Impression:   The primary encounter diagnosis was Dysphagia, unspecified type. Diagnoses of Esophageal dysphagia and Chest pain were also pertinent to this visit.                           Lesly Pineda MD  Resident  02/04/18 [id number]

## 2018-02-05 NOTE — PLAN OF CARE
Problem: Patient Care Overview  Goal: Plan of Care Review  Outcome: Ongoing (interventions implemented as appropriate)  AAO x 4, VSS, no falls noted, fall precautions remain, skin intact, pain assessed, call light within reach, bed wheels locked, bed in lowest position, side rails x 2, safety maintained, NADN, will continue to monitor.

## 2018-02-05 NOTE — DIALYSIS - OUTPATIENT INFORMATION
Treatment Plan  02/04/2018  8:01 PM    Patient seen and examined.  Case discussed with attending.     Recommend that she stay NPO with IVF and that she received 1 dose of glucagon as well as Levsin X 1.  If symptoms are not better within 30 minutes will perform EGD.     Rafia Magana M.D.  Gastroenterology Fellow, PGY-IV  Pager: [phone]  [facility]

## 2018-02-05 NOTE — HPI
Patient is a 52 year old lady with a h/o DM II, HTN, HLD, CVA, migraines, and multiple esophageal dilations.  She presents with difficulty swallowing.  This has gotten worse throughout the day.  She was seen at an OSH and transferred her for GI consult/EGD.  Patient first noticed symptoms yesterday at 19:00 after eating beef and beans.  She began having substernal burning, belching, and sensation of food bolus in her mid-esophagus.  She was unable to tolerate fluids.  She received Zofran, glucagon, and GI cocktail at OSH prior to transfer.  SHe was seen by GI, who took her for an EGD.  Patient currently complains of throat pain.  Denies chest pain, SOB, dizziness, palpitations, fever/chills, N/V.  She is able to swallow her secretions.  Granddaughter at bedside assists with translation, as patient is Czech speaking.

## 2018-02-05 NOTE — CONSULTS
"[facility]  Gastroenterology  Consult Note    Patient Name: Dianna Perez  MRN: [id number]  Admission Date: 2/4/2018  Hospital Length of Stay: 0 days  Code Status: No Order   Attending Provider: Erika Vincent MD   Consulting Provider: Josué Anthony MD  Primary Care Physician: Primary Doctor No  Principal Problem:<principal problem not specified>    Inpatient consult to Gastroenterology  Consult performed by: JOSUÉ ANTHONY  Consult ordered by: KALI DUKE        Subjective:     HPI:  52 year old female with a history of HTN, Hypothyroidism, T2DM, and Asthma who was transferred from an outside hospital due to ongoing dysphagia. GI consulted to evaluate dysphagia and need for scope.    Yesterday at 7 pm patient has beef and beans (she quantifies it as a hand full). After the meal she began to complain of ongoing substernal burning, bleching, sensation that food was stuck in her mid-esophagus, and inability to tolerate fluids (cannot even drink a glass of water without the water coming right up). Between 7pm-12 am symptoms continued to worsen but she decided to wait because she thought she would improve. Throughout the night she barely slept and basically sent her night sitting up. By the mid-day she decided to present to the Ed.     In the ED at the OSH the following is reported:  "Patient with retching and inability to tolerate swallowing anything.  Feels just like her previous events that have required dilation.  We tried Zofran, glucagon, GI cocktail but she still feels the GI cocktail is getting stuck midesophagus.  Labs show unremarkable CBC, mild transaminitis of unclear etiology given no acute right upper quadrant pain (some ongoing discomfort).  Chest x-ray pending.  Given that we do not have ability to do formal swallow evaluations here during the weekend with the ability to obtain a GI consult and any time, likely will require transfer for further " "evaluation."    Upon speaking to her in the ED she feels that she continues to worsen. She feels that her substernal burning, bleching, and inability to tolerate liquids has been presented for a prolonged time that she now reports a headache as well as generalized fatigue.    Throughout the entire interview patient continued to belch and when asked to swallow some of her own saliva she started to cough/dry heaving (no emesis).    Prior GI procedures:  Records not available however patient reports 17 prior dilations    Past Medical History:   Diagnosis Date    Asthma     Diabetes mellitus     Hypertension     Migraine headache     Stroke     Thyroid disease        Past Surgical History:   Procedure Laterality Date    APPENDECTOMY       SECTION      CHOLECYSTECTOMY      HYSTERECTOMY      UMBILICAL HERNIA REPAIR         Review of patient's allergies indicates:   Allergen Reactions    Diazepam Hallucinations     Family History     None        Social History Main Topics    Smoking status: Never Smoker    Smokeless tobacco: Never Used    Alcohol use No    Drug use: No    Sexual activity: No     Review of Systems   Constitutional: Positive for fatigue. Negative for activity change, appetite change, chills, diaphoresis, fever and unexpected weight change.   HENT: Negative for trouble swallowing.    Respiratory: Negative for cough, chest tightness and shortness of breath.    Cardiovascular: Negative for chest pain and palpitations.   Gastrointestinal: Positive for nausea. Negative for abdominal distention, abdominal pain, anal bleeding, blood in stool, constipation, diarrhea, rectal pain and vomiting.   Genitourinary: Negative.    Musculoskeletal: Negative.    Neurological: Positive for weakness and headaches. Negative for dizziness, seizures and numbness.     Objective:     Vital Signs (Most Recent):  Temp: 98 °F (36.7 °C) (18 1709)  Pulse: 79 (18 1831)  Resp: 20 (18 1801)  BP: " 134/78 (02/04/18 1831)  SpO2: 97 % (02/04/18 1831) Vital Signs (24h Range):  Temp:  [98 °F (36.7 °C)-98.6 °F (37 °C)] 98 °F (36.7 °C)  Pulse:  [71-96] 79  Resp:  [16-20] 20  SpO2:  [96 %-99 %] 97 %  BP: (133-168)/() 134/78     Weight: 102.5 kg (226 lb) (02/04/18 1709)  Body mass index is 38.79 kg/m².    No intake or output data in the 24 hours ending 02/04/18 2007    Lines/Drains/Airways     Peripheral Intravenous Line                 Peripheral IV - Single Lumen 02/04/18 1249 Left Antecubital less than 1 day                Physical Exam   Constitutional: She is oriented to person, place, and time. No distress.   HENT:   Head: Normocephalic and atraumatic.   Eyes: No scleral icterus.   Neck: Normal range of motion.   Cardiovascular: Normal rate and regular rhythm.    Pulmonary/Chest: Effort normal and breath sounds normal.   Sating well (97%) on room air, no anterior chest crepitus   Abdominal: Soft. Bowel sounds are normal. She exhibits no distension and no mass. There is no tenderness. There is no rebound and no guarding. No hernia.   Frequent belching but no evidence of excessive salivation   Musculoskeletal: Normal range of motion. She exhibits no edema.   Neurological: She is alert and oriented to person, place, and time.   Skin: She is not diaphoretic.       Significant Labs:  CBC:   Recent Labs  Lab 02/04/18  1249   WBC 7.26   HGB 14.7   HCT 43.8        CMP:   Recent Labs  Lab 02/04/18  1249   *   CALCIUM 9.3   ALBUMIN 4.3   PROT 7.8      K 3.9   CO2 29      BUN 12   CREATININE 0.66   ALKPHOS 176*   ALT 98*   AST 82*   BILITOT 0.5     Coagulation:   Recent Labs  Lab 02/04/18  1249   INR 1.0   APTT 31.6       Significant Imaging:  Imaging results within the past 24 hours have been reviewed.    Assessment/Plan:     Esophageal dysphagia    52 year old female with a history of HTN, Hypothyroidism, T2DM, and Asthma who was transferred from an outside hospital due to ongoing  dysphagia. GI consulted to evaluate dysphagia and need for scope.    Patient reports symptom onset as last night, however delayed care as she was hopeful that symptoms would pass. Unsure if there is still a food bolus as VS are stable and she isn't having excessive secretions. However at OSH unable to pass a liquid channel and here with simply asking her to swallow saliva she started coughing/dry heaving (no emesis). A second dose of glucagon was give at 8:15 pm with no improvement in her symptoms. She overall feels like she is getting worse as due to the prolonged duration she now feels weak and has a headache.    Recommendations:  -NPO with IVF for EGD today further recs after EGD            Thank you for your consult. I will follow-up with patient. Please contact us if you have any additional questions.    Rafia Magana M.D.  Gastroenterology Fellow, PGY-IV  Pager: [phone]  [facility]    I was present with the fellow during the above evaluation, including history and exam.  I discussed the case with the fellow and agree with the findings and plan as documented in the fellow's note.

## 2018-02-05 NOTE — PROVATION PATIENT INSTRUCTIONS
Discharge Summary/Instructions after an Endoscopic Procedure  Patient Name: Dianna Perez  Patient MRN: [id number]  Patient YOB: 1965 Sunday, February 04, 2018  Lorenzo Howard MD  RESTRICTIONS:  During your procedure today, you received medications for sedation.  These   medications may affect your judgment, balance and coordination.  Therefore,   for 24 hours, you have the following restrictions:   - DO NOT drive a car, operate machinery, make legal/financial decisions,   sign important papers or drink alcohol.    ACTIVITY:  The following day: return to full activity including work, except no heavy   lifting, straining or running for 3 days if polyps were removed.  DIET:  Eat and drink normally unless instructed otherwise.     TREATMENT FOR COMMON SIDE EFFECTS:  - Mild abdominal pain, belching, bloating or excessive gas: rest, eat   lightly and use a heating pad.  - Sore Throat: treat with throat lozenges and/or gargle with warm salt   water.  SYMPTOMS TO WATCH FOR AND REPORT TO YOUR PHYSICIAN:  1. Abdominal pain or bloating, other than gas cramps.  2. Chest pain.  3. Back pain.  4. Chills or fever occurring within 24 hours after the procedure.  5. Rectal bleeding, which would show as bright red, maroon, or black stools.   (A tablespoon of blood from the rectum is not serious, especially if   hemorrhoids are present.)  6. Vomiting.  7. Weakness or dizziness.  8. Because air was used during the procedure, expelling large amounts of air   from your rectum or belching is normal.  9. If a bowel prep was taken, you may not have a bowel movement for 1-3   days.  This is normal.  GO DIRECTLY TO THE NEAREST EMERGENCY ROOM IF YOU HAVE ANY OF THE FOLLOWING:      Difficulty breathing  Chills and/or fever over 101 F   Persistent vomiting and/or vomiting blood   Severe abdominal pain   Severe chest pain   Black, tarry stools   Bleeding- more than one tablespoon   Any other symptom or condition that you may feel  needs urgent attention  Your doctor recommends these additional instructions:  If any biopsies were taken, your doctor s clinic will contact you in 1 to 2   weeks with any results.  We are waiting for your pathology results.   Telephone your endoscopist for pathology results in two weeks.   An appointment has been made (or make an appointment) to see a   gastroenterologist.   Return to your GI clinic in two weeks.   Your physician has recommended an esophagram tomorrow.   The findings and recommendations were discussed with your primary physician.     Take a full liquid diet.  For questions, problems or results please call your physician - Lorenzo Howard MD at Work:  [phone].  [facility], EMERGENCY ROOM PHONE NUMBER: [phone]  IF A COMPLICATION OR EMERGENCY SITUATION ARISES AND YOU ARE UNABLE TO REACH   YOUR PHYSICIAN - GO DIRECTLY TO THE EMERGENCY ROOM.  Lorenzo Howard MD  2/4/2018 11:19:46 PM  This report has been verified and signed electronically.

## 2018-02-05 NOTE — ED NOTES
APPEARANCE: awake and alert in NAD.  SKIN: warm, dry and intact. No breakdown or bruising.  MUSCULOSKELETAL: Patient moving all extremities spontaneously, no obvious swelling or deformities noted. Ambulates independently.  RESPIRATORY: no shortness of breath. RR 20 equal and unlabored.   CARDIAC: heart tones normal. Regular rate and rhythm; 2+ distal pulses; no peripheral edema  ABDOMEN: S/ND/NT, normoactive bowel sounds present in all four quadrants. Normal stool pattern.  : voids spontaneously without difficulty.  Neurologic: AAO x 4; follows commands equal strength in all extremities;No numbness and tingling.

## 2018-02-06 ENCOUNTER — NURSE TRIAGE (OUTPATIENT)
Dept: ADMINISTRATIVE | Facility: CLINIC | Age: 53
End: 2018-02-06

## 2018-02-06 NOTE — TELEPHONE ENCOUNTER
Reason for Disposition   [1] Severe difficulty swallowing (e.g., drooling or spitting, can't swallow water) AND [2] new onset AND [3] normal breathing    Protocols used: ST SWALLOWING DIFFICULTY-A-    Granddaughter calling to report Dianna can not swallow and is complaining of throat pain.  States can not swallow at all.  Per protocol advised ER.

## 2018-02-06 NOTE — DISCHARGE SUMMARY
[facility] Hospital Medicine  Discharge Summary      Patient Name: Dianna Perez  MRN: [id number]  Admission Date: 2/4/2018  Hospital Length of Stay: 0 days  Discharge Date and Time:  02/06/2018 12:21 PM  Attending Physician: Mar att. providers found   Discharging Provider: Paulina Ghosh PA-C  Primary Care Provider: Zara Arteaga MD  [facility] Medicine Team: [facility] Paulina Ghosh PA-C    HPI:   Patient is a 52 year old lady with a h/o DM II, HTN, HLD, CVA, migraines, and multiple esophageal dilations.  She presents with difficulty swallowing.  This has gotten worse throughout the day.  She was seen at an OSH and transferred her for GI consult/EGD.  Patient first noticed symptoms yesterday at 19:00 after eating beef and beans.  She began having substernal burning, belching, and sensation of food bolus in her mid-esophagus.  She was unable to tolerate fluids.  She received Zofran, glucagon, and GI cocktail at OSH prior to transfer.  SHe was seen by GI, who took her for an EGD.  Patient currently complains of throat pain.  Denies chest pain, SOB, dizziness, palpitations, fever/chills, N/V.  She is able to swallow her secretions.  Granddaughter at bedside assists with translation, as patient is [place]ic speaking.    Procedure(s) (LRB):  ESOPHAGOGASTRODUODENOSCOPY (EGD) (N/A)      Hospital Course:   Pt admitted to observation for dysphagia.  GI consulted and completed EGD which revealed one gastric polyp (biopsied); erythematous mucosa in the gastric fundus, gastric body, antrum, prepyloric region of the stomach and pylorus (biopsied); 3 cm hiatal hernia (biopsied); tortuous esophagus.  Following EGD results GI recommended esophagram which revealed esophageal dysmotility with tertiary contractions.  Results reviewed with patient via hospital provided .  GI recommended follow up in clinic, pt stable for discharge.     Consults:   Consults         Status Ordering Provider      Inpatient consult to Gastroenterology  Once     Provider:  (Not yet assigned)    Completed KALI DUKE          * Esophageal dysphagia    - Patient s/p EGD.  Procedure noted normal examined duodenum, eythematous mucosa in the gastric fundus, gastric body, antrum, prepyloric region of the stomach-biopsied, 3 cm hiatal hernia, and tortuous esophagus. One gastric polyp-biopsied.  - Esophagram complete: dysmotility.  - Will need follow-up in GI clinic in two weeks.  - Supportive care, aspiration precautions.        Mixed hyperlipidemia    - Continue pravastatin 40mg daily.          Type 2 diabetes mellitus without complication, without long-term current use of insulin    - Held metformin, glyburide.  - NPO SSI.  - Resumed diabetic diet at discharge.          Hypertension    - Continue lisinopril HCTZ 10-12.5mg daily.          Acquired hypothyroidism    - Continue levothyroxine 25mcg daily.            Final Active Diagnoses:    Diagnosis Date Noted POA    PRINCIPAL PROBLEM:  Esophageal dysphagia [R13.10] 02/05/2018 Yes    Mixed hyperlipidemia [E78.2] 02/05/2018 Yes     Chronic    Type 2 diabetes mellitus without complication, without long-term current use of insulin [E11.9]  Yes     Chronic    Hypertension [I10]  Yes     Chronic    Acquired hypothyroidism [E03.9]  Yes     Chronic      Problems Resolved During this Admission:    Diagnosis Date Noted Date Resolved POA       Discharged Condition: good    Disposition: Home or Self Care    Follow Up:  Follow-up Information     Zara Arteaga MD On 2/19/2018.    Specialty:  Internal Medicine  Why:  at 1:00pm; [facility] [address]  Contact information:  [address] [place] [id number]  [phone]                 Patient Instructions:     Diet diabetic   Order Comments: Small frequent meals     Activity as tolerated         Significant Diagnostic Studies: Labs: All labs within the past 24 hours have been reviewed    Pending  Diagnostic Studies:     None         Medications:  Reconciled Home Medications:   Discharge Medication List as of 2/5/2018  4:49 PM      CONTINUE these medications which have NOT CHANGED    Details   calcium carbonate (TUMS) 200 mg calcium (500 mg) chewable tablet Take 1 tablet by mouth once daily., Historical Med      citalopram (CELEXA) 40 MG tablet Take 40 mg by mouth once daily., Historical Med      codeine-butalbital-ASA-caffeine (BUTALBITAL COMPOUND-CODEINE) 42--40 mg Cap Take 1 capsule by mouth every 4 (four) hours as needed., Historical Med      fluticasone (FLONASE) 50 mcg/actuation nasal spray 1 spray by Each Nare route once daily., Historical Med      glyBURIDE (DIABETA) 5 MG tablet Take 5 mg by mouth daily with breakfast., Historical Med      hyoscyamine (LEVSIN/SL) 0.125 mg Subl Place 0.125 mg under the tongue every 4 (four) hours as needed., Historical Med      levothyroxine (SYNTHROID) 25 MCG tablet Take 25 mcg by mouth once daily., Historical Med      lisinopril-hydrochlorothiazide (PRINZIDE,ZESTORETIC) 10-12.5 mg per tablet Take 1 tablet by mouth once daily., Historical Med      metFORMIN (GLUCOPHAGE) 850 MG tablet Take 850 mg by mouth 2 (two) times daily with meals., Historical Med      pantoprazole (PROTONIX) 40 MG tablet Take 40 mg by mouth once daily., Historical Med      pravastatin (PRAVACHOL) 10 MG tablet Take 10 mg by mouth once daily., Historical Med      prochlorperazine (COMPAZINE) 10 MG tablet Take 1 tablet (10 mg total) by mouth 3 (three) times daily as needed., Starting u 12/21/2017, Print      ranitidine (ZANTAC) 150 MG tablet Take 150 mg by mouth 2 (two) times daily., Historical Med         STOP taking these medications       esomeprazole (NEXIUM) 20 MG capsule Comments:   Reason for Stopping:               Indwelling Lines/Drains at time of discharge:   Lines/Drains/Airways          No matching active lines, drains, or airways          Time spent on the discharge of patient:  >30 minutes  Patient was seen and examined on the date of discharge and determined to be suitable for discharge.         Paulina Ghosh PA-C  Department of Hospital Medicine  [facility]

## 2018-02-06 NOTE — ANESTHESIA POSTPROCEDURE EVALUATION
"Anesthesia Post Evaluation    Patient: Dianna Perez    Procedure(s) Performed: Procedure(s) (LRB):  ESOPHAGOGASTRODUODENOSCOPY (EGD) (N/A)    Final Anesthesia Type: general  Patient location during evaluation: PACU  Patient participation: Yes- Able to Participate  Level of consciousness: awake and alert and oriented  Pain management: adequate  Airway patency: patent  PONV status at discharge: No PONV  Anesthetic complications: no      Cardiovascular status: blood pressure returned to baseline and hemodynamically stable  Respiratory status: unassisted  Hydration status: euvolemic  Follow-up not needed.        Visit Vitals  /62   Pulse 72   Temp 36.1 °C (97 °F)   Resp 16   Ht 5' 4" (1.626 m)   Wt 102.5 kg (226 lb)   LMP  (Exact Date)   SpO2 95%   Breastfeeding? No   BMI 38.79 kg/m²       Pain/William Score: Pain Assessment Performed: Yes (2/5/2018  4:00 PM)  Presence of Pain: denies (2/5/2018  4:00 PM)  Pain Rating Prior to Med Admin: 8 (2/5/2018  1:31 PM)  Pain Rating Post Med Admin: 6 (2/5/2018  2:30 PM)  William Score: 10 (2/5/2018 12:05 AM)      "

## 2018-02-06 NOTE — PLAN OF CARE
02/06/18 0734   Final Note   Assessment Type Final Discharge Note     Patient discharged home with no needs 2/5/18.

## 2018-02-06 NOTE — ASSESSMENT & PLAN NOTE
- Patient s/p EGD.  Procedure noted normal examined duodenum, eythematous mucosa in the gastric fundus, gastric body, antrum, prepyloric region of the stomach-biopsied, 3 cm hiatal hernia, and tortuous esophagus. One gastric polyp-biopsied.  - Esophagram complete: dysmotility.  - Will need follow-up in GI clinic in two weeks.  - Supportive care, aspiration precautions.

## 2018-02-06 NOTE — HOSPITAL COURSE
Pt admitted to observation for dysphagia.  GI consulted and completed EGD which revealed one gastric polyp (biopsied); erythematous mucosa in the gastric fundus, gastric body, antrum, prepyloric region of the stomach and pylorus (biopsied); 3 cm hiatal hernia (biopsied); tortuous esophagus.  Following EGD results GI recommended esophagram which revealed esophageal dysmotility with tertiary contractions.  Results reviewed with patient via hospital provided .  GI recommended follow up in clinic, pt stable for discharge.

## 2018-02-09 ENCOUNTER — TELEPHONE (OUTPATIENT)
Dept: GASTROENTEROLOGY | Facility: CLINIC | Age: 53
End: 2018-02-09

## 2018-02-09 ENCOUNTER — TELEPHONE (OUTPATIENT)
Dept: GASTROENTEROLOGY | Facility: HOSPITAL | Age: 53
End: 2018-02-09

## 2018-02-09 NOTE — TELEPHONE ENCOUNTER
02/09/2018  8:57 AM    Called Mrs. Perez in order to let her know that her pathology was negative.    Unable to speak to patient but left her a message with my call back number.      Rafia Magana M.D.  Gastroenterology Fellow, PGY-IV  Pager: [phone]  [facility]

## 2018-02-09 NOTE — TELEPHONE ENCOUNTER
----- Message from Taylor Escobar sent at 2/9/2018  3:04 PM CST -----  Contact: Pt black:[phone]  Pt granddaughter called and states she missed a call from the nurse and would like to speak with the nurse.

## 2018-02-12 ENCOUNTER — TELEPHONE (OUTPATIENT)
Dept: GASTROENTEROLOGY | Facility: CLINIC | Age: 53
End: 2018-02-12

## 2018-02-12 NOTE — TELEPHONE ENCOUNTER
02/12/2018  8:00 AM    Tried to reach patient again, however was unable to reach her at her home or mobile number.    I left a message with my call back information.    Rafia Magana M.D.  Gastroenterology Fellow, PGY-IV  Pager: [phone]  [facility]

## 2018-02-15 ENCOUNTER — TELEPHONE (OUTPATIENT)
Dept: GASTROENTEROLOGY | Facility: CLINIC | Age: 53
End: 2018-02-15

## 2018-02-15 NOTE — TELEPHONE ENCOUNTER
----- Message from Bridget Jay sent at 2/15/2018  2:23 PM CST -----  Contact: Self- [phone]  [place]- pt is returning a missed call to Dr. Magana- please call pt back at [phone]

## 2018-04-12 ENCOUNTER — TELEPHONE (OUTPATIENT)
Dept: ENDOSCOPY | Facility: HOSPITAL | Age: 53
End: 2018-04-12

## 2018-04-12 ENCOUNTER — TELEPHONE (OUTPATIENT)
Dept: GASTROENTEROLOGY | Facility: CLINIC | Age: 53
End: 2018-04-12

## 2018-04-12 ENCOUNTER — OFFICE VISIT (OUTPATIENT)
Dept: GASTROENTEROLOGY | Facility: CLINIC | Age: 53
End: 2018-04-12
Payer: COMMERCIAL

## 2018-04-12 ENCOUNTER — LAB VISIT (OUTPATIENT)
Dept: LAB | Facility: HOSPITAL | Age: 53
End: 2018-04-12
Payer: COMMERCIAL

## 2018-04-12 VITALS
SYSTOLIC BLOOD PRESSURE: 123 MMHG | DIASTOLIC BLOOD PRESSURE: 78 MMHG | WEIGHT: 221.56 LBS | HEIGHT: 64 IN | HEART RATE: 77 BPM | BODY MASS INDEX: 37.83 KG/M2

## 2018-04-12 DIAGNOSIS — K62.5 BRIGHT RED BLOOD PER RECTUM: ICD-10-CM

## 2018-04-12 DIAGNOSIS — I10 ESSENTIAL HYPERTENSION: ICD-10-CM

## 2018-04-12 DIAGNOSIS — J45.909 ASTHMA, UNSPECIFIED ASTHMA SEVERITY, UNSPECIFIED WHETHER COMPLICATED, UNSPECIFIED WHETHER PERSISTENT: ICD-10-CM

## 2018-04-12 DIAGNOSIS — Z12.11 SPECIAL SCREENING FOR MALIGNANT NEOPLASMS, COLON: Primary | ICD-10-CM

## 2018-04-12 DIAGNOSIS — R74.8 ELEVATED ALKALINE PHOSPHATASE LEVEL: Primary | ICD-10-CM

## 2018-04-12 DIAGNOSIS — R13.10 DYSPHAGIA, UNSPECIFIED TYPE: ICD-10-CM

## 2018-04-12 DIAGNOSIS — R13.10 DYSPHAGIA, UNSPECIFIED TYPE: Primary | ICD-10-CM

## 2018-04-12 DIAGNOSIS — Z86.010 HISTORY OF COLON POLYPS: ICD-10-CM

## 2018-04-12 DIAGNOSIS — E11.8 TYPE 2 DIABETES MELLITUS WITH COMPLICATION, WITHOUT LONG-TERM CURRENT USE OF INSULIN: ICD-10-CM

## 2018-04-12 DIAGNOSIS — E78.2 MIXED HYPERLIPIDEMIA: ICD-10-CM

## 2018-04-12 LAB
ALBUMIN SERPL BCP-MCNC: 3.8 G/DL
ALP SERPL-CCNC: 145 U/L
ALT SERPL W/O P-5'-P-CCNC: 32 U/L
ANION GAP SERPL CALC-SCNC: 9 MMOL/L
AST SERPL-CCNC: 29 U/L
BASOPHILS # BLD AUTO: 0.04 K/UL
BASOPHILS NFR BLD: 0.5 %
BILIRUB SERPL-MCNC: 0.3 MG/DL
BUN SERPL-MCNC: 11 MG/DL
CALCIUM SERPL-MCNC: 9.9 MG/DL
CHLORIDE SERPL-SCNC: 101 MMOL/L
CO2 SERPL-SCNC: 30 MMOL/L
CREAT SERPL-MCNC: 0.8 MG/DL
DIFFERENTIAL METHOD: ABNORMAL
EOSINOPHIL # BLD AUTO: 0.1 K/UL
EOSINOPHIL NFR BLD: 1.7 %
ERYTHROCYTE [DISTWIDTH] IN BLOOD BY AUTOMATED COUNT: 14 %
EST. GFR  (AFRICAN AMERICAN): >60 ML/MIN/1.73 M^2
EST. GFR  (NON AFRICAN AMERICAN): >60 ML/MIN/1.73 M^2
GLUCOSE SERPL-MCNC: 147 MG/DL
HCT VFR BLD AUTO: 44.8 %
HGB BLD-MCNC: 14.2 G/DL
IMM GRANULOCYTES # BLD AUTO: 0.02 K/UL
IMM GRANULOCYTES NFR BLD AUTO: 0.3 %
LYMPHOCYTES # BLD AUTO: 2.6 K/UL
LYMPHOCYTES NFR BLD: 33.9 %
MAGNESIUM SERPL-MCNC: 2.2 MG/DL
MCH RBC QN AUTO: 26.7 PG
MCHC RBC AUTO-ENTMCNC: 31.7 G/DL
MCV RBC AUTO: 84 FL
MONOCYTES # BLD AUTO: 0.3 K/UL
MONOCYTES NFR BLD: 4.2 %
NEUTROPHILS # BLD AUTO: 4.5 K/UL
NEUTROPHILS NFR BLD: 59.4 %
NRBC BLD-RTO: 0 /100 WBC
PLATELET # BLD AUTO: 201 K/UL
PMV BLD AUTO: 12.3 FL
POTASSIUM SERPL-SCNC: 4.4 MMOL/L
PROT SERPL-MCNC: 7.9 G/DL
RBC # BLD AUTO: 5.32 M/UL
SODIUM SERPL-SCNC: 140 MMOL/L
VIT B12 SERPL-MCNC: 1776 PG/ML
WBC # BLD AUTO: 7.53 K/UL

## 2018-04-12 PROCEDURE — 80053 COMPREHEN METABOLIC PANEL: CPT

## 2018-04-12 PROCEDURE — 83735 ASSAY OF MAGNESIUM: CPT

## 2018-04-12 PROCEDURE — 99213 OFFICE O/P EST LOW 20 MIN: CPT | Mod: S$GLB,,, | Performed by: STUDENT IN AN ORGANIZED HEALTH CARE EDUCATION/TRAINING PROGRAM

## 2018-04-12 PROCEDURE — 99999 PR PBB SHADOW E&M-EST. PATIENT-LVL III: CPT | Mod: PBBFAC,,, | Performed by: STUDENT IN AN ORGANIZED HEALTH CARE EDUCATION/TRAINING PROGRAM

## 2018-04-12 PROCEDURE — 36415 COLL VENOUS BLD VENIPUNCTURE: CPT

## 2018-04-12 PROCEDURE — 82607 VITAMIN B-12: CPT

## 2018-04-12 PROCEDURE — 85025 COMPLETE CBC W/AUTO DIFF WBC: CPT

## 2018-04-12 RX ORDER — SODIUM, POTASSIUM,MAG SULFATES 17.5-3.13G
1 SOLUTION, RECONSTITUTED, ORAL ORAL ONCE
Qty: 1 BOTTLE | Refills: 0 | Status: SHIPPED | OUTPATIENT
Start: 2018-04-12 | End: 2018-04-12

## 2018-04-12 NOTE — PROGRESS NOTES
I was present with Rafia Magana MD the fellow during the above evaluation, including history and exam.  I discussed the case with the fellow and agree with the findings and plan as documented in the fellow's note.

## 2018-04-12 NOTE — TELEPHONE ENCOUNTER
04/12/2018   6:59 PM    Called Mrs. Perez to discuss labs and additional follow up. Patient verbalized understanding of all the directions given.  In additional a patient portal e-mail was sent with all the information.  Message routed to MA to help with follow up appointment as well as scheduling labs.    Rafia Magana M.D.  Gastroenterology Fellow, PGY-IV  Pager: [phone]  [facility]

## 2018-04-12 NOTE — TELEPHONE ENCOUNTER
Patient in Endoscopy Scheduling to schedule colonoscopy and manometry. Patient is Sets[place] speaking and has granddaughter, Dianna, to translate, as she prefers her over an [facility] . Procedures scheduled. Verbally reviewed prep instructions. Dianna translated to patient. Patient verbalized understanding and has no questions. Written prep instructions given to Dianna in English. She stated she would translate for patient. Dianna stated she would be with patient for procedures.

## 2018-04-12 NOTE — PATIENT INSTRUCTIONS
--Please obtain labs today    --Please schedule manometry and colonoscopy today    --For now please continue taking your medications as prescribed    --Please return to clinic once manometry and colonoscopy are done    --I will send my note/labs  to your PCP however when you see her in May please make sure she has gotten my results    Rafia Magana M.D.  Gastroenterology Fellow, PGY-IV  [facility]

## 2018-04-12 NOTE — PROGRESS NOTES
"     Gastroenterology Clinic    Reason for visit: The primary encounter diagnosis was Dysphagia, unspecified type. Diagnoses of Bright red blood per rectum, History of colon polyps, Essential hypertension, Mixed hyperlipidemia, Type 2 diabetes mellitus with complication, without long-term current use of insulin, and Asthma, unspecified asthma severity, unspecified whether complicated, unspecified whether persistent were also pertinent to this visit.  Referring provider/PCP: Primary Doctor No    HPI:  52 year old female with a history of HTN, T2DM, Hypothyroidism, Asthma (last attack ~2 years ago), and CVA (september 2016) who presents to clinic as a follow after being seen in the hospital in early February.    Initial consult note from 2/4/18:  Yesterday at 7 pm patient has beef and beans (she quantifies it as a hand full). After the meal she began to complain of ongoing substernal burning, bleching, sensation that food was stuck in her mid-esophagus, and inability to tolerate fluids (cannot even drink a glass of water without the water coming right up). Between 7pm-12 am symptoms continued to worsen but she decided to wait because she thought she would improve. Throughout the night she barely slept and basically sent her night sitting up. By the mid-day she decided to present to the Ed.   In the ED at the OSH the following is reported:  "Patient with retching and inability to tolerate swallowing anything.  Feels just like her previous events that have required dilation.  We tried Zofran, glucagon, GI cocktail but she still feels the GI cocktail is getting stuck midesophagus.  Labs show unremarkable CBC, mild transaminitis of unclear etiology given no acute right upper quadrant pain (some ongoing discomfort).  Chest x-ray pending.  Given that we do not have ability to do formal swallow evaluations here during the weekend with the ability to obtain a GI consult and any time, likely will require transfer for further " "evaluation."  Upon speaking to her in the ED she feels that she continues to worsen. She feels that her substernal burning, bleching, and inability to tolerate liquids has been presented for a prolonged time that she now reports a headache as well as generalized fatigue. Throughout the entire interview patient continued to belch and when asked to swallow some of her own saliva she started to cough/dry heaving (no emesis).    Interval history:  After seeing her in the ED she underwent an EGD which was essentially unremarkable and an esophogram which revealed 3 contractions.    Upon going home she was doing well but has once again become symptomatic within the last 5 days.   First of all she reports increasing gas/bloating which are quite embarrassing as well as dysphagia which she describes as a sensation that food is getting suck in her lower esophagus. She also complains of intermittent regurgitation. Since being seen in the hospital she has been on Omeprazole BID, Zantac at night, and Levsin as needed (within the last 5 days she has been using Levsin approximately 6 times a day). Upper from her upper digestive symptoms she reports 2 episode of bright red blood per rectum. The first episode was Saturday afternoon and consisted of only blood. She then had a bowel movement 2 hours later which consisted of stool with bright red blood. After those 2 episodes patient has been having well-formed brown stools.     Of note she reports multiple EGD with ~ 17 dilations (report not available). Last reports a colonoscopy approximately 4 years ago during which she had 3-6 polyps and was instructed to follow up in 3-5 years (report not available).     Lastly both mom and dad had HCC due to chronic hepatitis.    PEndoHx:  EGD 02/2018  - Normal examined duodenum.  - One gastric polyp. Biopsied.  - Erythematous mucosa in the gastric fundus, gastric body, antrum, prepyloric region of the stomach and pylorus. Biopsied.  - 3 cm hiatal " hernia. Biopsied.  - Tortuous esophagus.    Pathology:  PREPYLORIC SMALL POLYP BIOPSY:  BENIGN POLYPOID GASTRIC MUCOSA WITH HYPERPLASTIC CHANGE.  NO INTESTINAL METAPLASIA OR DYSPLASIA IDENTIFIED.  RANDOM STOMACH BIOPSY:  MILD CHRONIC GASTRITIS WITH FOCAL GLANDULAR DILATION.  IMMUNOSTAIN FOR H. PYLORI IS NEGATIVE, WITH PROPER POSITIVE AND NEGATIVE CONTROLS.  NO INTESTINAL METAPLASIA OR DYSPLASIA IDENTIFIED.  ESOPHAGUS BIOPSY:  BENIGN ESOPHAGEAL MUCOSA.  NO EOSINOPHILIC ESOPHAGITIS OR DYSPLASIA IDENTIFIED.    Review of Systems:  Constitutional: no fever, chills or change in weight   Eyes: no visual changes   ENT: no sore throat or dysphagia  Respiratory: no cough or shortness of breath   Cardiovascular: no chest pain or palpitations   Gastrointestinal: as per HPI  Hematologic/Lymphatic: no easy bruising or lymphadenopathy   Musculoskeletal: no arthralgias or myalgias   Neurological: no change in mental status  Behavioral/Psych: no change in mood      Past Medical History:   Diagnosis Date    Asthma     Diabetes mellitus     Hypertension     Migraine headache     Stroke     Thyroid disease        Past Surgical History:   Procedure Laterality Date    APPENDECTOMY       SECTION      CHOLECYSTECTOMY      HYSTERECTOMY      UMBILICAL HERNIA REPAIR         No family history on file.    Social History     Social History    Marital status:      Spouse name: N/A    Number of children: N/A    Years of education: N/A     Occupational History    Not on file.     Social History Main Topics    Smoking status: Never Smoker    Smokeless tobacco: Never Used    Alcohol use No    Drug use: No    Sexual activity: No     Other Topics Concern    Not on file     Social History Narrative    No narrative on file       Current Outpatient Prescriptions on File Prior to Visit   Medication Sig Dispense Refill    calcium carbonate (TUMS) 200 mg calcium (500 mg) chewable tablet Take 1 tablet by mouth once daily.  "     citalopram (CELEXA) 40 MG tablet Take 40 mg by mouth once daily.      codeine-butalbital-ASA-caffeine (BUTALBITAL COMPOUND-CODEINE) 62--40 mg Cap Take 1 capsule by mouth every 4 (four) hours as needed.      fluticasone (FLONASE) 50 mcg/actuation nasal spray 1 spray by Each Nare route once daily.      glyBURIDE (DIABETA) 5 MG tablet Take 5 mg by mouth daily with breakfast.      hyoscyamine (LEVSIN/SL) 0.125 mg Subl Place 0.125 mg under the tongue every 4 (four) hours as needed.      levothyroxine (SYNTHROID) 25 MCG tablet Take 25 mcg by mouth once daily.      lisinopril-hydrochlorothiazide (PRINZIDE,ZESTORETIC) 10-12.5 mg per tablet Take 1 tablet by mouth once daily.      metFORMIN (GLUCOPHAGE) 850 MG tablet Take 850 mg by mouth 2 (two) times daily with meals.      pantoprazole (PROTONIX) 40 MG tablet Take 40 mg by mouth once daily.      pravastatin (PRAVACHOL) 10 MG tablet Take 10 mg by mouth once daily.      ranitidine (ZANTAC) 150 MG tablet Take 150 mg by mouth 2 (two) times daily.      [DISCONTINUED] prochlorperazine (COMPAZINE) 10 MG tablet Take 1 tablet (10 mg total) by mouth 3 (three) times daily as needed. 15 tablet 0     No current facility-administered medications on file prior to visit.        Review of patient's allergies indicates:   Allergen Reactions    Diazepam Hallucinations       Physical Exam:  /78   Pulse 77   Ht 5' 4" (1.626 m)   Wt 100.5 kg (221 lb 9 oz)   BMI 38.03 kg/m²   General appearance: AAOX3, obese  Eyes: conjunctivae/corneas clear. PERRL, EOM's intact. Fundi benign.  Lungs: clear to auscultation bilaterally  Chest wall: no tenderness  Heart: regular rate and rhythm, S1, S2 normal, no murmur, click, rub or gallop  Abdomen: soft, non-tender; bowel sounds normal; no masses,  no organomegaly   Rectal: small exterior skin tag, on KD there were no hemorrhoids/significant mass felt and there was yellow/brown stool in the rectal vault which was " FOBT  Extremities: extremities normal, atraumatic, no cyanosis or edema  Pulses: 2+ and symmetric    Laboratory:  CMP  Sodium   Date Value Ref Range Status   02/05/2018 138 136 - 145 mmol/L Final     Potassium   Date Value Ref Range Status   02/05/2018 4.0 3.5 - 5.1 mmol/L Final     Chloride   Date Value Ref Range Status   02/05/2018 106 95 - 110 mmol/L Final     CO2   Date Value Ref Range Status   02/05/2018 24 23 - 29 mmol/L Final     Glucose   Date Value Ref Range Status   02/05/2018 224 (H) 70 - 110 mg/dL Final     BUN, Bld   Date Value Ref Range Status   02/05/2018 12 6 - 20 mg/dL Final     Creatinine   Date Value Ref Range Status   02/05/2018 0.8 0.5 - 1.4 mg/dL Final     Calcium   Date Value Ref Range Status   02/05/2018 8.6 (L) 8.7 - 10.5 mg/dL Final     Total Protein   Date Value Ref Range Status   02/05/2018 6.7 6.0 - 8.4 g/dL Final     Albumin   Date Value Ref Range Status   02/05/2018 3.0 (L) 3.5 - 5.2 g/dL Final     Total Bilirubin   Date Value Ref Range Status   02/05/2018 0.8 0.1 - 1.0 mg/dL Final     Comment:     For infants and newborns, interpretation of results should be based  on gestational age, weight and in agreement with clinical  observations.  Premature Infant recommended reference ranges:  Up to 24 hours.............<8.0 mg/dL  Up to 48 hours............<12.0 mg/dL  3-5 days..................<15.0 mg/dL  6-29 days.................<15.0 mg/dL       Alkaline Phosphatase   Date Value Ref Range Status   02/05/2018 128 55 - 135 U/L Final     AST   Date Value Ref Range Status   02/05/2018 41 (H) 10 - 40 U/L Final     ALT   Date Value Ref Range Status   02/05/2018 54 (H) 10 - 44 U/L Final     Anion Gap   Date Value Ref Range Status   02/05/2018 8 8 - 16 mmol/L Final     eGFR if    Date Value Ref Range Status   02/05/2018 >60.0 >60 mL/min/1.73 m^2 Final     eGFR if non    Date Value Ref Range Status   02/05/2018 >60.0 >60 mL/min/1.73 m^2 Final     Comment:      Calculation used to obtain the estimated glomerular filtration  rate (eGFR) is the CKD-EPI equation.        Imaging:  Esophogram 02/2018  The esophagus is normal in contour and caliber without evidence of masses or strictures. Esophageal dysmotility with tertiary contractions observed. No gastroesophageal reflux occurred during this exam.  No hiatal hernia demonstrated.  The gastric cardia is unremarkable in appearance.    Assessment:  52 year old female with a history of HTN, T2DM, Hypothyroidism, Asthma (last attack ~2 years ago), and CVA (september 2016) who presents to clinic as a follow after being seen in the hospital in early February.    Plan:  Esophageal dysphagia which seems like a longstanding issues as per patient she has had multiple EGD with dilation which have not helped much. We scoped patient at a point in time where her symptoms were at their worse however the EGD did not show esophageal narrowing/ring/stricture and biopsies were negative for EOE/H pylori (gastric polyp was also benign). After her EGD she did have an esophagram which was remarkable for tertiary contractions. Since leaving the hospital she had been doing relatively well however has become more symptomatic within the last 5 days. At this point we need to further investigate her esophageal motility with manometry (not pursuing a repeat EGD because as mention before her EGD was essentially unremarkable at her most symptomatic point). Apart from her main complaint she also complains of gas/bloating and intermittent regurgitation however would first like to obtain a manometry study to see if all her symptoms can be explained by a motility issue. She will likely require additional workup if manometry is unrevealing.  --CMP, CBC, Mag, B12 today (DEXA at next visit but would first like to make sure she has not had with PCP)  --Continue current medications (PPI BID, Zantac QHS, and Levsin as needed) will make adjustment once manometry and  colonoscopy are completed as patient is currently quite symptomatic  --Manometry to evaluate for esophageal motility disorders    Bright red blood per rectum/history of colon polyps per patient. Based on her description as well as our rectal exam the bright red blood per rectum was likely hemorrhoidal in nature however colonoscopy is still warranted in the setting of a history of multiple prior polyps.  --CBC today  --Colonoscopy    Hypertension, T2DM, Hypothyroidism, Asthma  --Pateint will follow up with PCP in [facility] in May 2018, will send my note as well as labs (once resulted to clinic below), patient also instructed to notify PCP to contact use if note/labs have not been received  [facility]  Address: [address] [place] [address]  Phone: [phone]    RTC AFTER MANOMETRY AND COLONOSCOPY (daughter will contact us to schedule appointment)     Orders Placed This Encounter   Procedures    Comprehensive metabolic panel    CBC auto differential    Magnesium    Vitamin B12     Rafia Magana M.D.  Gastroenterology Fellow, PGY-IV  Pager: [phone]  [facility]

## 2018-04-13 ENCOUNTER — PATIENT MESSAGE (OUTPATIENT)
Dept: GASTROENTEROLOGY | Facility: CLINIC | Age: 53
End: 2018-04-13

## 2018-04-17 ENCOUNTER — HOSPITAL ENCOUNTER (OUTPATIENT)
Facility: HOSPITAL | Age: 53
Discharge: HOME | End: 2018-04-17
Attending: INTERNAL MEDICINE | Admitting: INTERNAL MEDICINE
Payer: COMMERCIAL

## 2018-04-17 ENCOUNTER — SURGERY (OUTPATIENT)
Age: 53
End: 2018-04-17

## 2018-04-17 VITALS
RESPIRATION RATE: 18 BRPM | WEIGHT: 220 LBS | HEART RATE: 71 BPM | BODY MASS INDEX: 37.56 KG/M2 | DIASTOLIC BLOOD PRESSURE: 67 MMHG | TEMPERATURE: 98 F | HEIGHT: 64 IN | OXYGEN SATURATION: 96 % | SYSTOLIC BLOOD PRESSURE: 133 MMHG

## 2018-04-17 DIAGNOSIS — R13.19 ESOPHAGEAL DYSPHAGIA: Primary | ICD-10-CM

## 2018-04-17 LAB
GLUCOSE SERPL-MCNC: 131 MG/DL (ref 70–110)
POCT GLUCOSE: 131 MG/DL (ref 70–110)

## 2018-04-17 PROCEDURE — 25000003 PHARM REV CODE 250: Performed by: INTERNAL MEDICINE

## 2018-04-17 PROCEDURE — 91010 ESOPHAGUS MOTILITY STUDY: CPT | Performed by: INTERNAL MEDICINE

## 2018-04-17 PROCEDURE — 91037 ESOPH IMPED FUNCTION TEST: CPT | Performed by: INTERNAL MEDICINE

## 2018-04-17 PROCEDURE — 82962 GLUCOSE BLOOD TEST: CPT | Performed by: INTERNAL MEDICINE

## 2018-04-17 RX ORDER — INSULIN ASPART 100 [IU]/ML
INJECTION, SUSPENSION SUBCUTANEOUS
COMMUNITY

## 2018-04-17 RX ORDER — LIDOCAINE HYDROCHLORIDE 20 MG/ML
JELLY TOPICAL ONCE AS NEEDED
Status: COMPLETED | OUTPATIENT
Start: 2018-04-17 | End: 2018-04-17

## 2018-04-17 RX ADMIN — LIDOCAINE HYDROCHLORIDE 10 ML: 20 JELLY TOPICAL at 08:04

## 2018-04-17 NOTE — PROGRESS NOTES
Agustina Haddad from [employer], phone number [phone]   code [phone]    Pt #[phone]

## 2018-04-18 PROCEDURE — 91010 ESOPHAGUS MOTILITY STUDY: CPT | Mod: 26,,, | Performed by: INTERNAL MEDICINE

## 2018-04-18 NOTE — PROVATION PATIENT INSTRUCTIONS
Discharge Summary/Instructions after an Endoscopic Procedure  Patient Name: Dianna Perez  Patient MRN: [id number]  Patient YOB: 1965 Wednesday, April 18, 2018  Jole Cole MD  RESTRICTIONS:  During your procedure today, you received medications for sedation.  These   medications may affect your judgment, balance and coordination.  Therefore,   for 24 hours, you have the following restrictions:   - DO NOT drive a car, operate machinery, make legal/financial decisions,   sign important papers or drink alcohol.    ACTIVITY:  The following day: return to full activity including work, except no heavy   lifting, straining or running for 3 days if polyps were removed.  DIET:  Eat and drink normally unless instructed otherwise.     TREATMENT FOR COMMON SIDE EFFECTS:  - Mild abdominal pain, nausea, belching, bloating or excessive gas:  rest,   eat lightly and use a heating pad.  - Sore Throat: treat with throat lozenges and/or gargle with warm salt   water.  - Because air was used during the procedure, expelling large amounts of air   from your rectum or belching is normal.  - If a bowel prep was taken, you may not have a bowel movement for 1-3 days.    This is normal.  SYMPTOMS TO WATCH FOR AND REPORT TO YOUR PHYSICIAN:  1. Abdominal pain or bloating, other than gas cramps.  2. Chest pain.  3. Back pain.  4. Signs of infection such as: chills or fever occurring within 24 hours   after the procedure.  5. Rectal bleeding, which would show as bright red, maroon, or black stools.   (A tablespoon of blood from the rectum is not serious, especially if   hemorrhoids are present.)  6. Vomiting.  7. Weakness or dizziness.  GO DIRECTLY TO THE NEAREST EMERGENCY ROOM IF YOU HAVE ANY OF THE FOLLOWING:      Difficulty breathing              Chills and/or fever over 101 F   Persistent vomiting and/or vomiting blood   Severe abdominal pain   Severe chest pain   Black, tarry stools   Bleeding- more than one tablespoon   Any  other symptom or condition that you feel may need urgent attention  Your doctor recommends these additional instructions:  If any biopsies were taken, your doctors clinic will contact you in 1 to 2   weeks with any results.  - Discharge patient to home (ambulatory).   - Resume previous diet.   - Continue present medications.   - Return to referring physician after studies are complete.   because of the high amplitude contractions, one might approach as a   hypercontractile case with antispasm, calcium channel, or BOTOX injection  For questions, problems or results please call your physician - Joel Cole MD at Work:  [phone].  [facility], EMERGENCY ROOM PHONE NUMBER: [phone]  IF A COMPLICATION OR EMERGENCY SITUATION ARISES AND YOU ARE UNABLE TO REACH   YOUR PHYSICIAN - GO DIRECTLY TO THE EMERGENCY ROOM.  Joel Cole MD  4/18/2018 8:12:05 AM  This report has been verified and signed electronically.

## 2018-04-19 ENCOUNTER — TELEPHONE (OUTPATIENT)
Dept: ENDOSCOPY | Facility: HOSPITAL | Age: 53
End: 2018-04-19

## 2018-04-19 ENCOUNTER — TELEPHONE (OUTPATIENT)
Dept: GASTROENTEROLOGY | Facility: CLINIC | Age: 53
End: 2018-04-19

## 2018-04-19 RX ORDER — HYOSCYAMINE SULFATE 0.12 MG/1
0.12 TABLET SUBLINGUAL EVERY 4 HOURS PRN
Qty: 30 TABLET | Refills: 3 | Status: SHIPPED | OUTPATIENT
Start: 2018-04-19 | End: 2018-04-20 | Stop reason: SDUPTHER

## 2018-04-19 NOTE — TELEPHONE ENCOUNTER
Contacted patient through [employer]. EGD/colonoscopy rescheduled 5/14/18. Patient verbalized understanding. New prep instructions mailed to patient, as patient confirmed she has someone to translate them for her. Patient has no questions at this time.

## 2018-04-19 NOTE — TELEPHONE ENCOUNTER
----- Message from Rafia Magana MD sent at 4/19/2018  8:42 AM CDT -----  Hey Melyssa     Let just reschedule them and like that she only gets anesthesia once but make sure its with Lee.    Let me know when its rescheduled for so that I can call her.    Dr. LARA  ----- Message -----  From: Melyssa Rondon RN  Sent: 4/19/2018   8:40 AM  To: MD Karen Blankenship Dr.,    The day she is scheduled, there isn't time to add an EGD. I can either schedule just the EGD a different day, or reschedule both together. Please let me know.    Thank you,  Melyssa  ----- Message -----  From: Rafia Magana MD  Sent: 4/18/2018   4:25 PM  To: STAS Foley Melyssa Perez is scheduled for a colon with colon on 5/11. Can we add an EGD for dilation on that same day?    Rafia Magana M.D.  Gastroenterology Fellow, PGY-IV  Pager: [phone]  [facility]

## 2018-04-19 NOTE — TELEPHONE ENCOUNTER
Dr. Magana,    Patient is rescheduled to 5/14/18 with Dr. Howard for both EGD/colonoscopy.    Thank you,  Melyssa

## 2018-04-20 ENCOUNTER — TELEPHONE (OUTPATIENT)
Dept: GASTROENTEROLOGY | Facility: CLINIC | Age: 53
End: 2018-04-20

## 2018-04-20 DIAGNOSIS — R13.10 DYSPHAGIA, UNSPECIFIED TYPE: Primary | ICD-10-CM

## 2018-04-20 RX ORDER — HYOSCYAMINE SULFATE 0.12 MG/1
0.25 TABLET SUBLINGUAL EVERY 4 HOURS PRN
Qty: 30 TABLET | Refills: 3 | Status: SHIPPED | OUTPATIENT
Start: 2018-04-20

## 2018-04-20 NOTE — TELEPHONE ENCOUNTER
04/20/2018  4:59 PM    Spoke to patient this afternoon and she states that she feels a mix of substernal burning as well as pain. She took her PPI and H2 blocker as well as over the counter Gaviscon.    At this point will increase levsin to max dose as this medications seems to be effective for the patient.    Max dose is 1.5 mg/day    Rafia Magana M.D.  Gastroenterology Fellow, PGY-IV  Pager: [phone]  [facility]

## 2018-04-24 ENCOUNTER — TELEPHONE (OUTPATIENT)
Dept: ENDOSCOPY | Facility: HOSPITAL | Age: 53
End: 2018-04-24

## 2018-05-02 ENCOUNTER — TELEPHONE (OUTPATIENT)
Dept: GASTROENTEROLOGY | Facility: CLINIC | Age: 53
End: 2018-05-02

## 2018-05-02 DIAGNOSIS — K22.4 ESOPHAGEAL DYSMOTILITY: Primary | ICD-10-CM

## 2018-05-02 DIAGNOSIS — K21.9 GASTROESOPHAGEAL REFLUX DISEASE WITHOUT ESOPHAGITIS: ICD-10-CM

## 2018-05-02 RX ORDER — PANTOPRAZOLE SODIUM 40 MG/1
40 TABLET, DELAYED RELEASE ORAL DAILY
Qty: 30 TABLET | Refills: 12 | Status: SHIPPED | OUTPATIENT
Start: 2018-05-02 | End: 2018-11-27 | Stop reason: SDUPTHER

## 2018-05-02 NOTE — TELEPHONE ENCOUNTER
----- Message from Cassi Bernal MA sent at 5/2/2018  3:34 PM CDT -----  Contact: Self- [phone]      ----- Message -----  From: Bridget Jay  Sent: 5/2/2018   3:30 PM  To: [place] Staff                                  Prescription Refill Request  Name of medication and dose Omeprazole 40mg     Pharmacy [facility] [address] [place] [address] [address] [facility] AT [facility] & [address] [phone]     Are you completely out of your medication YES    Additional Information:

## 2018-05-02 NOTE — TELEPHONE ENCOUNTER
----- Message from Bridget Jay sent at 5/2/2018  3:30 PM CDT -----  Contact: Self- [phone]                                Prescription Refill Request  Name of medication and dose Omeprazole 40mg     Pharmacy [facility] [address] [place] [address] [address] [place] AT [facility] [place] & [address] [phone]     Are you completely out of your medication YES    Additional Information:

## 2018-05-04 ENCOUNTER — TELEPHONE (OUTPATIENT)
Dept: GASTROENTEROLOGY | Facility: CLINIC | Age: 53
End: 2018-05-04

## 2018-05-04 NOTE — TELEPHONE ENCOUNTER
05/04/2018  4:35 PM    Called patient's grand daughter and explained that her script had been sent to the pharmacy on 5/2 at 1600.    She verbalized understanding and said she would head out to  the script.    Rafia Magana M.D.  Gastroenterology Fellow, PGY-IV  Pager: [phone]  [facility]      ----- Message from Cassi Bernal MA sent at 5/4/2018  4:11 PM CDT -----  Contact: pt's granddaughter Dianna Fong [phone]      ----- Message -----  From: Tanika Templeton  Sent: 5/4/2018   4:02 PM  To: Mariia Buckner RN, [place] [facility] Staff                                  Prescription Refill Request  Name of medication and dose   Omeprazole 40mg     Pharmacy   [facility] [address] [place] [address] [address] [facility] AT [facility] [place] & [address]   [phone] (Phone)  [phone] (Fax)    Are you completely out of your medication Yes    Additional Information:  Dianna Fong states pt has been calling for a few days for a refill and has not received a return call or medication has not been sent to rx and pt is completely out of her medication. Pt's granddaughter is asking that medication be sent today and she be notified once completed.

## 2018-05-04 NOTE — TELEPHONE ENCOUNTER
----- Message from Tanika Templeton sent at 5/4/2018  4:02 PM CDT -----  Contact: pt's granddaughter Dianna Fong [phone]                                Prescription Refill Request  Name of medication and dose   Omeprazole 40mg     Pharmacy   [facility] [address] [place] [address] [address] [facility] AT [facility] [place] & [address]   [phone] (Phone)  [phone] (Fax)    Are you completely out of your medication Yes    Additional Information:  Dianna Fong states pt has been calling for a few days for a refill and has not received a return call or medication has not been sent to rx and pt is completely out of her medication. Pt's granddaughter is asking that medication be sent today and she be notified once completed.

## 2018-05-11 ENCOUNTER — TELEPHONE (OUTPATIENT)
Dept: GASTROENTEROLOGY | Facility: CLINIC | Age: 53
End: 2018-05-11

## 2018-05-11 NOTE — TELEPHONE ENCOUNTER
----- Message from Caryl Riley sent at 5/11/2018  4:03 PM CDT -----  Contact: self   beverly - calling re her bloodwork - please call patient at

## 2018-05-16 ENCOUNTER — TELEPHONE (OUTPATIENT)
Dept: GASTROENTEROLOGY | Facility: CLINIC | Age: 53
End: 2018-05-16

## 2018-05-16 NOTE — TELEPHONE ENCOUNTER
----- Message from Tanika Templeton sent at 5/4/2018  4:02 PM CDT -----  Contact: pt's granddaughter Dianna Fong [phone]                                Prescription Refill Request  Name of medication and dose   Omeprazole 40mg     Pharmacy   [facility] [id number] [place] [address] [address] [facility] AT [facility] [place] & [address]   [phone] (Phone)  [phone] (Fax)    Are you completely out of your medication Yes    Additional Information:  Dianna Fong states pt has been calling for a few days for a refill and has not received a return call or medication has not been sent to rx and pt is completely out of her medication. Pt's granddaughter is asking that medication be sent today and she be notified once completed.

## 2018-05-18 NOTE — TELEPHONE ENCOUNTER
Attempted to call granddaughter to follow up and make sure prescriptions was received, however there was no answer.     Called [facility] to see if pt had picked up rx.  [facility] states they have received rx, however it had not been filled yet.  [facility] will go ahead and fill the rx that was sent in by Dr. Magana and notify pt when ready for .

## 2018-06-01 ENCOUNTER — ANESTHESIA (OUTPATIENT)
Dept: ENDOSCOPY | Facility: HOSPITAL | Age: 53
End: 2018-06-01
Payer: COMMERCIAL

## 2018-06-01 ENCOUNTER — HOSPITAL ENCOUNTER (OUTPATIENT)
Facility: HOSPITAL | Age: 53
Discharge: HOME | End: 2018-06-01
Attending: INTERNAL MEDICINE | Admitting: INTERNAL MEDICINE
Payer: COMMERCIAL

## 2018-06-01 ENCOUNTER — SURGERY (OUTPATIENT)
Age: 53
End: 2018-06-01

## 2018-06-01 ENCOUNTER — ANESTHESIA EVENT (OUTPATIENT)
Dept: ENDOSCOPY | Facility: HOSPITAL | Age: 53
End: 2018-06-01
Payer: COMMERCIAL

## 2018-06-01 ENCOUNTER — PATIENT MESSAGE (OUTPATIENT)
Dept: GASTROENTEROLOGY | Facility: CLINIC | Age: 53
End: 2018-06-01

## 2018-06-01 VITALS
BODY MASS INDEX: 38.93 KG/M2 | HEIGHT: 64 IN | DIASTOLIC BLOOD PRESSURE: 79 MMHG | SYSTOLIC BLOOD PRESSURE: 134 MMHG | HEART RATE: 69 BPM | WEIGHT: 228 LBS | TEMPERATURE: 99 F | OXYGEN SATURATION: 97 % | RESPIRATION RATE: 18 BRPM

## 2018-06-01 DIAGNOSIS — R13.10 DYSPHAGIA, UNSPECIFIED TYPE: ICD-10-CM

## 2018-06-01 LAB — POCT GLUCOSE: 222 MG/DL (ref 70–110)

## 2018-06-01 PROCEDURE — 45378 DIAGNOSTIC COLONOSCOPY: CPT | Performed by: INTERNAL MEDICINE

## 2018-06-01 PROCEDURE — 88305 TISSUE EXAM BY PATHOLOGIST: CPT | Performed by: PATHOLOGY

## 2018-06-01 PROCEDURE — 45378 DIAGNOSTIC COLONOSCOPY: CPT | Mod: ,,, | Performed by: INTERNAL MEDICINE

## 2018-06-01 PROCEDURE — E9220 PRA ENDO ANESTHESIA: HCPCS | Mod: ,,, | Performed by: NURSE ANESTHETIST, CERTIFIED REGISTERED

## 2018-06-01 PROCEDURE — 27201012 HC FORCEPS, HOT/COLD, DISP: Performed by: INTERNAL MEDICINE

## 2018-06-01 PROCEDURE — 25000003 PHARM REV CODE 250: Performed by: INTERNAL MEDICINE

## 2018-06-01 PROCEDURE — 43239 EGD BIOPSY SINGLE/MULTIPLE: CPT | Performed by: INTERNAL MEDICINE

## 2018-06-01 PROCEDURE — 43239 EGD BIOPSY SINGLE/MULTIPLE: CPT | Mod: 59,,, | Performed by: INTERNAL MEDICINE

## 2018-06-01 PROCEDURE — 63600175 PHARM REV CODE 636 W HCPCS: Performed by: NURSE ANESTHETIST, CERTIFIED REGISTERED

## 2018-06-01 PROCEDURE — 43249 ESOPH EGD DILATION <30 MM: CPT | Mod: 51,,, | Performed by: INTERNAL MEDICINE

## 2018-06-01 PROCEDURE — 37000008 HC ANESTHESIA 1ST 15 MINUTES: Performed by: INTERNAL MEDICINE

## 2018-06-01 PROCEDURE — 37000009 HC ANESTHESIA EA ADD 15 MINS: Performed by: INTERNAL MEDICINE

## 2018-06-01 PROCEDURE — 43249 ESOPH EGD DILATION <30 MM: CPT | Performed by: INTERNAL MEDICINE

## 2018-06-01 PROCEDURE — 25000003 PHARM REV CODE 250: Performed by: NURSE ANESTHETIST, CERTIFIED REGISTERED

## 2018-06-01 PROCEDURE — 82962 GLUCOSE BLOOD TEST: CPT | Performed by: INTERNAL MEDICINE

## 2018-06-01 PROCEDURE — C1726 CATH, BAL DIL, NON-VASCULAR: HCPCS | Performed by: INTERNAL MEDICINE

## 2018-06-01 RX ORDER — SODIUM CHLORIDE 9 MG/ML
INJECTION, SOLUTION INTRAVENOUS CONTINUOUS
Status: DISCONTINUED | OUTPATIENT
Start: 2018-06-01 | End: 2018-06-01 | Stop reason: HOSPADM

## 2018-06-01 RX ORDER — GLYCOPYRROLATE 0.2 MG/ML
INJECTION INTRAMUSCULAR; INTRAVENOUS
Status: DISCONTINUED | OUTPATIENT
Start: 2018-06-01 | End: 2018-06-01

## 2018-06-01 RX ORDER — PROPOFOL 10 MG/ML
VIAL (ML) INTRAVENOUS CONTINUOUS PRN
Status: DISCONTINUED | OUTPATIENT
Start: 2018-06-01 | End: 2018-06-01

## 2018-06-01 RX ORDER — LIDOCAINE HCL/PF 100 MG/5ML
SYRINGE (ML) INTRAVENOUS
Status: DISCONTINUED | OUTPATIENT
Start: 2018-06-01 | End: 2018-06-01

## 2018-06-01 RX ORDER — LIDOCAINE HYDROCHLORIDE 20 MG/ML
1 INJECTION, SOLUTION EPIDURAL; INFILTRATION; INTRACAUDAL; PERINEURAL ONCE
Status: DISCONTINUED | OUTPATIENT
Start: 2018-06-01 | End: 2018-06-01 | Stop reason: HOSPADM

## 2018-06-01 RX ORDER — PROPOFOL 10 MG/ML
VIAL (ML) INTRAVENOUS
Status: DISCONTINUED | OUTPATIENT
Start: 2018-06-01 | End: 2018-06-01

## 2018-06-01 RX ADMIN — SODIUM CHLORIDE: 0.9 INJECTION, SOLUTION INTRAVENOUS at 03:06

## 2018-06-01 RX ADMIN — GLYCOPYRROLATE 0.2 MG: 0.2 INJECTION, SOLUTION INTRAMUSCULAR; INTRAVENOUS at 03:06

## 2018-06-01 RX ADMIN — PROPOFOL 70 MG: 10 INJECTION, EMULSION INTRAVENOUS at 03:06

## 2018-06-01 RX ADMIN — LIDOCAINE HYDROCHLORIDE 100 MG: 20 INJECTION, SOLUTION INTRAVENOUS at 03:06

## 2018-06-01 RX ADMIN — PROPOFOL 150 MCG/KG/MIN: 10 INJECTION, EMULSION INTRAVENOUS at 03:06

## 2018-06-01 NOTE — PROVATION PATIENT INSTRUCTIONS
Discharge Summary/Instructions after an Endoscopic Procedure  Patient Name: Dianna Perez  Patient MRN: [id number]  Patient YOB: 1965 Friday, June 01, 2018  Lorenzo Howard MD  RESTRICTIONS:  During your procedure today, you received medications for sedation.  These   medications may affect your judgment, balance and coordination.  Therefore,   for 24 hours, you have the following restrictions:   - DO NOT drive a car, operate machinery, make legal/financial decisions,   sign important papers or drink alcohol.    ACTIVITY:  The following day: return to full activity including work, except no heavy   lifting, straining or running for 3 days if polyps were removed.  DIET:  Eat and drink normally unless instructed otherwise.     TREATMENT FOR COMMON SIDE EFFECTS:  - Mild abdominal pain, nausea, belching, bloating or excessive gas:  rest,   eat lightly and use a heating pad.  - Sore Throat: treat with throat lozenges and/or gargle with warm salt   water.  - Because air was used during the procedure, expelling large amounts of air   from your rectum or belching is normal.  - If a bowel prep was taken, you may not have a bowel movement for 1-3 days.    This is normal.  SYMPTOMS TO WATCH FOR AND REPORT TO YOUR PHYSICIAN:  1. Abdominal pain or bloating, other than gas cramps.  2. Chest pain.  3. Back pain.  4. Signs of infection such as: chills or fever occurring within 24 hours   after the procedure.  5. Rectal bleeding, which would show as bright red, maroon, or black stools.   (A tablespoon of blood from the rectum is not serious, especially if   hemorrhoids are present.)  6. Vomiting.  7. Weakness or dizziness.  GO DIRECTLY TO THE NEAREST EMERGENCY ROOM IF YOU HAVE ANY OF THE FOLLOWING:      Difficulty breathing              Chills and/or fever over 101 F   Persistent vomiting and/or vomiting blood   Severe abdominal pain   Severe chest pain   Black, tarry stools   Bleeding- more than one tablespoon   Any  other symptom or condition that you feel may need urgent attention  Your doctor recommends these additional instructions:  If any biopsies were taken, your doctors clinic will contact you in 1 to 2   weeks with any results.  - Discharge patient to home.   - Repeat colonoscopy in 5 years for surveillance due to history of prior   colon polyps.  - The findings and recommendations were discussed with the patient.   - Return to referring physician.   - The findings and recommendations were discussed with the patient.  For questions, problems or results please call your physician - Lorenzo Howard MD at Work:  [phone].  [facility], EMERGENCY ROOM PHONE NUMBER: [phone]  IF A COMPLICATION OR EMERGENCY SITUATION ARISES AND YOU ARE UNABLE TO REACH   YOUR PHYSICIAN - GO DIRECTLY TO THE EMERGENCY ROOM.  Lorenzo Howard MD  6/1/2018 3:52:47 PM  This report has been verified and signed electronically.  [employer]

## 2018-06-01 NOTE — TRANSFER OF CARE
"Anesthesia Transfer of Care Note    Patient: Dianna Perez    Procedure(s) Performed: Procedure(s) (LRB):  ESOPHAGOGASTRODUODENOSCOPY (EGD) (N/A)  COLONOSCOPY (N/A)    Patient location: PACU    Anesthesia Type: general    Transport from OR: Transported from OR on 2-3 L/min O2 by NC with adequate spontaneous ventilation    Post pain: adequate analgesia    Post assessment: no apparent anesthetic complications and tolerated procedure well    Post vital signs: stable    Level of consciousness: sedated and responds to stimulation    Complications: none    Transfer of care protocol was followed      Last vitals:   Visit Vitals  /67   Pulse 74   Temp 37 °C (98.6 °F)   Resp 16   Ht 5' 4" (1.626 m)   Wt 103.4 kg (228 lb)   SpO2 95%   Breastfeeding? No   BMI 39.14 kg/m²     "

## 2018-06-01 NOTE — H&P
[facility]  History & Physical    Subjective:      Chief Complaint/Reason for Admission:     EGd and colonoscopy    Dianna Perez is a 52 y.o. female.    Past Medical History:   Diagnosis Date    Asthma     Diabetes mellitus     Hypertension     Migraine headache     Stroke 2016    Thyroid disease      Past Surgical History:   Procedure Laterality Date    APPENDECTOMY       SECTION      CHOLECYSTECTOMY      HYSTERECTOMY      UMBILICAL HERNIA REPAIR       Family History   Problem Relation Age of Onset    Hepatitis Mother     Cancer Mother     Hepatitis Father     Cancer Father      Social History   Substance Use Topics    Smoking status: Never Smoker    Smokeless tobacco: Never Used    Alcohol use No       PTA Medications   Medication Sig    calcium carbonate (TUMS) 200 mg calcium (500 mg) chewable tablet Take 1 tablet by mouth once daily.    citalopram (CELEXA) 40 MG tablet Take 40 mg by mouth once daily.    codeine-butalbital-ASA-caffeine (BUTALBITAL COMPOUND-CODEINE) 43--40 mg Cap Take 1 capsule by mouth every 4 (four) hours as needed.    glyBURIDE (DIABETA) 5 MG tablet Take 5 mg by mouth daily with breakfast.    insulin aspart protamine-insulin aspart (NOVOLOG 70/30) 100 unit/mL (70-30) InPn pen Inject into the skin 2 (two) times daily before meals.    metFORMIN (GLUCOPHAGE) 850 MG tablet Take 850 mg by mouth 2 (two) times daily with meals.    pravastatin (PRAVACHOL) 10 MG tablet Take 10 mg by mouth once daily.    ranitidine (ZANTAC) 150 MG tablet Take 150 mg by mouth 2 (two) times daily.    fluticasone (FLONASE) 50 mcg/actuation nasal spray 1 spray by Each Nare route once daily.    hyoscyamine (LEVSIN/SL) 0.125 mg Subl Place 2 tablets (0.25 mg total) under the tongue every 4 (four) hours as needed.    levothyroxine (SYNTHROID) 25 MCG tablet Take 25 mcg by mouth once daily.    lisinopril-hydrochlorothiazide (PRINZIDE,ZESTORETIC) 10-12.5  mg per tablet Take 1 tablet by mouth once daily.    pantoprazole (PROTONIX) 40 MG tablet Take 1 tablet (40 mg total) by mouth once daily.     Review of patient's allergies indicates:   Allergen Reactions    Diazepam Hallucinations        Review of Systems   Constitutional: Negative for chills and fever.   Respiratory: Negative for shortness of breath.    Cardiovascular: Negative for chest pain.   Gastrointestinal: Positive for blood in stool.       Objective:      Vital Signs (Most Recent)  Temp: 98.6 °F (37 °C) (06/01/18 1417)  Pulse: 74 (06/01/18 1417)  Resp: 16 (06/01/18 1417)  BP: 128/67 (06/01/18 1417)  SpO2: 95 % (06/01/18 1417)    Vital Signs Range (Last 24H):  Temp:  [98.6 °F (37 °C)]   Pulse:  [74]   Resp:  [16]   BP: (128)/(67)   SpO2:  [95 %]     Physical Exam   Constitutional: She appears well-developed and well-nourished.   Cardiovascular: Normal rate.    Pulmonary/Chest: Effort normal.   Skin: Skin is warm and dry.   Psychiatric: She has a normal mood and affect. Her behavior is normal. Judgment and thought content normal.           Assessment:      Active Hospital Problems    Diagnosis  POA    Dysphagia [R13.10]  Yes      Resolved Hospital Problems    Diagnosis Date Resolved POA   No resolved problems to display.       Plan:    EGD and colonoscopy for dysphagia and hematochezia and history of colon polyps.

## 2018-06-01 NOTE — DISCHARGE INSTRUCTIONS

## 2018-06-01 NOTE — ANESTHESIA PREPROCEDURE EVALUATION
2018  Dianna Perez is a 52 y.o., female with PMHx of HTN, DMII, hypothyroidism and history of multiple esophageal dilations presents for repeat scope    Pre-operative evaluation for ESOPHAGOGASTRODUODENOSCOPY (EGD) (N/A), COLONOSCOPY (N/A)      Past Surgical History:   Procedure Laterality Date    APPENDECTOMY       SECTION      CHOLECYSTECTOMY      HYSTERECTOMY      UMBILICAL HERNIA REPAIR           Vital Signs Range (Last 24H):         CBC:   No results for input(s): WBC, RBC, HGB, HCT, PLT, MCV, MCH, MCHC in the last 720 hours.    CMP: No results for input(s): NA, K, CL, CO2, BUN, CREATININE, GLU, MG, PHOS, CALCIUM, ALBUMIN, PROT, ALKPHOS, ALT, AST, BILITOT in the last 720 hours.    INR:  No results for input(s): PT, INR, PROTIME, APTT in the last 720 hours.  Pre-op Assessment    I have reviewed the Patient Summary Reports.     I have reviewed the Nursing Notes.   I have reviewed the Medications.     Review of Systems  Anesthesia Hx:  No problems with previous Anesthesia  History of prior surgery of interest to airway management or planning: Denies Family Hx of Anesthesia complications.   Denies Personal Hx of Anesthesia complications.   Social:  Non-Smoker    Hematology/Oncology:  Hematology Normal   Oncology Normal     EENT/Dental:EENT/Dental Normal   Cardiovascular:   Exercise tolerance: good Hypertension Denies MI.  Denies CAD.    Denies CABG/stent.  hyperlipidemia ECG has been reviewed.    Pulmonary:   Denies Pneumonia Denies COPD. Asthma asymptomatic    Renal/:  Renal/ Normal     Hepatic/GI:  Hepatic/GI Normal    Musculoskeletal:  Musculoskeletal Normal    Neurological:   CVA (residual L sided weakness), residual symptoms Headaches Denies Seizures.    Endocrine:   Diabetes, type 2 Hypothyroidism    Dermatological:  Skin Normal    Psych:  Psychiatric Normal            Physical Exam  General:  Well nourished, Obesity    Airway/Jaw/Neck:  Airway Findings: Mouth Opening: Normal Tongue: Large  Mallampati: III  Improves to III with phonation.  TM Distance: Normal, at least 6 cm  Jaw/Neck Findings:  Neck ROM: Normal Extension, Painful     Eyes/Ears/Nose:  EYES/EARS/NOSE FINDINGS: Normal   Dental:  Dental Findings: upper partial dentures   Chest/Lungs:  Chest/Lungs Findings: Normal Respiratory Rate     Heart/Vascular:  Heart Findings: Rate: Normal  Rhythm: Regular Rhythm        Mental Status:  Mental Status Findings: Normal        Anesthesia Plan  Type of Anesthesia, risks & benefits discussed:  Anesthesia Type:  general  Patient's Preference:   Intra-op Monitoring Plan: standard ASA monitors  Intra-op Monitoring Plan Comments:   Post Op Pain Control Plan:   Post Op Pain Control Plan Comments:   Induction:   IV  Beta Blocker:  Patient is not currently on a Beta-Blocker (No further documentation required).       Informed Consent: Patient understands risks and agrees with Anesthesia plan.  Questions answered. Anesthesia consent signed with patient.  ASA Score: 3     Day of Surgery Review of History & Physical:    H&P update referred to the provider.     Anesthesia Plan Notes: Consent obtained with [place] interpretor        Ready For Surgery From Anesthesia Perspective.

## 2018-06-01 NOTE — PROVATION PATIENT INSTRUCTIONS
Discharge Summary/Instructions after an Endoscopic Procedure  Patient Name: Dianna Perez  Patient MRN: [id number]  Patient YOB: 1965 Friday, June 01, 2018  Lorenzo Howard MD  RESTRICTIONS:  During your procedure today, you received medications for sedation.  These   medications may affect your judgment, balance and coordination.  Therefore,   for 24 hours, you have the following restrictions:   - DO NOT drive a car, operate machinery, make legal/financial decisions,   sign important papers or drink alcohol.    ACTIVITY:  The following day: return to full activity including work, except no heavy   lifting, straining or running for 3 days if polyps were removed.  DIET:  Eat and drink normally unless instructed otherwise.     TREATMENT FOR COMMON SIDE EFFECTS:  - Mild abdominal pain, nausea, belching, bloating or excessive gas:  rest,   eat lightly and use a heating pad.  - Sore Throat: treat with throat lozenges and/or gargle with warm salt   water.  - Because air was used during the procedure, expelling large amounts of air   from your rectum or belching is normal.  - If a bowel prep was taken, you may not have a bowel movement for 1-3 days.    This is normal.  SYMPTOMS TO WATCH FOR AND REPORT TO YOUR PHYSICIAN:  1. Abdominal pain or bloating, other than gas cramps.  2. Chest pain.  3. Back pain.  4. Signs of infection such as: chills or fever occurring within 24 hours   after the procedure.  5. Rectal bleeding, which would show as bright red, maroon, or black stools.   (A tablespoon of blood from the rectum is not serious, especially if   hemorrhoids are present.)  6. Vomiting.  7. Weakness or dizziness.  GO DIRECTLY TO THE NEAREST EMERGENCY ROOM IF YOU HAVE ANY OF THE FOLLOWING:      Difficulty breathing              Chills and/or fever over 101 F   Persistent vomiting and/or vomiting blood   Severe abdominal pain   Severe chest pain   Black, tarry stools   Bleeding- more than one tablespoon   Any  other symptom or condition that you feel may need urgent attention  Your doctor recommends these additional instructions:  If any biopsies were taken, your doctors clinic will contact you in 1 to 2   weeks with any results.  - Discharge patient to home.   - Follow an antireflux regimen.   - Use a proton pump inhibitor by mouth once daily.   - Perform routine esophageal manometry at the next available appointment.   - Return to referring physician - Rafia Magana MD  - The findings and recommendations were discussed with the patient.   - The findings and recommendations were discussed with the patient's   family.  For questions, problems or results please call your physician - Lorenzo Howard MD at Work:  [phone].  [facility], EMERGENCY ROOM PHONE NUMBER: [phone]  IF A COMPLICATION OR EMERGENCY SITUATION ARISES AND YOU ARE UNABLE TO REACH   YOUR PHYSICIAN - GO DIRECTLY TO THE EMERGENCY ROOM.  Lorenzo Howard MD  6/1/2018 3:35:40 PM  This report has been verified and signed electronically.  [employer]

## 2018-06-01 NOTE — ANESTHESIA POSTPROCEDURE EVALUATION
"Anesthesia Post Evaluation    Patient: Dianna Perez    Procedure(s) Performed: Procedure(s) (LRB):  ESOPHAGOGASTRODUODENOSCOPY (EGD) (N/A)  COLONOSCOPY (N/A)    Final Anesthesia Type: general  Patient location during evaluation: PACU  Patient participation: Yes- Able to Participate  Level of consciousness: awake and alert and oriented  Post-procedure vital signs: reviewed and stable  Pain management: adequate  Airway patency: patent  PONV status at discharge: No PONV  Anesthetic complications: no      Cardiovascular status: blood pressure returned to baseline  Respiratory status: unassisted, room air and spontaneous ventilation  Hydration status: euvolemic  Follow-up not needed.        Visit Vitals  /65 (BP Location: Left arm, Patient Position: Lying)   Pulse 76   Temp 37.1 °C (98.7 °F) (Temporal)   Resp 18   Ht 5' 4" (1.626 m)   Wt 103.4 kg (228 lb)   SpO2 97%   Breastfeeding? No   BMI 39.14 kg/m²       Pain/William Score: Pain Assessment Performed: Yes (6/1/2018  4:01 PM)  Presence of Pain: denies (6/1/2018  4:01 PM)  William Score: 9 (6/1/2018  4:01 PM)      "

## 2018-06-08 ENCOUNTER — TELEPHONE (OUTPATIENT)
Dept: GASTROENTEROLOGY | Facility: CLINIC | Age: 53
End: 2018-06-08

## 2018-06-08 ENCOUNTER — TELEPHONE (OUTPATIENT)
Dept: ENDOSCOPY | Facility: HOSPITAL | Age: 53
End: 2018-06-08

## 2018-06-08 NOTE — TELEPHONE ENCOUNTER
----- Message from Caryl Lin sent at 6/8/2018  4:04 PM CDT -----  Contact: granddaughter - rebecca loya - [phone]  beverly - wants sooner appt than august - does not want to see dr kearney - wants to see dr paul - please call granddaughter - rebecca loya - [phone]

## 2018-08-16 ENCOUNTER — OFFICE VISIT (OUTPATIENT)
Dept: GASTROENTEROLOGY | Facility: CLINIC | Age: 53
End: 2018-08-16
Payer: COMMERCIAL

## 2018-08-16 VITALS
BODY MASS INDEX: 35.99 KG/M2 | SYSTOLIC BLOOD PRESSURE: 129 MMHG | WEIGHT: 216 LBS | HEART RATE: 91 BPM | HEIGHT: 65 IN | DIASTOLIC BLOOD PRESSURE: 84 MMHG

## 2018-08-16 DIAGNOSIS — R13.10 DYSPHAGIA, UNSPECIFIED TYPE: Primary | ICD-10-CM

## 2018-08-16 PROCEDURE — 99999 PR PBB SHADOW E&M-EST. PATIENT-LVL IV: CPT | Mod: PBBFAC,,, | Performed by: STUDENT IN AN ORGANIZED HEALTH CARE EDUCATION/TRAINING PROGRAM

## 2018-08-16 PROCEDURE — 99213 OFFICE O/P EST LOW 20 MIN: CPT | Mod: S$GLB,,, | Performed by: STUDENT IN AN ORGANIZED HEALTH CARE EDUCATION/TRAINING PROGRAM

## 2018-08-16 NOTE — PATIENT INSTRUCTIONS
--EGD with botox, please call Dr. Magana after procedure    --Decrease Omeprazole to once a day    --Use Levsin as needed    --Follow up Dr. Magana in 3 months    Rafia Magana M.D.  Gastroenterology Fellow, PGY-V  [facility]

## 2018-08-16 NOTE — H&P (VIEW-ONLY)
"     Gastroenterology Clinic    Reason for visit: The encounter diagnosis was Dysphagia, unspecified type.  Referring provider/PCP: Primary Doctor No    HPI:  53 year old female with a history of HTN, HLD, Hypothyroidism, and DM who presents for a follow up.    Patient well known to me as I have seen in her in the inpatient setting as well as in clinic. Last seen in clinic in April and since then has had repeat EGD/colon with results below under Endo History.Today she presents by herself and is quite tearful as she feels that she cannot live like this any longer. She feels that she did not obtain any benefit from the esophageal dilation performed on June. Furthermore she feels that she is worsening as she no longer gets any benefit from Levsin (still on PPI and H2 blocker but truly not helping). She also needs to be careful with her posture because she feels that even bending down to grab something brings up the food. She is also now blending her food however even if this lifestyle modifications she is experiencing dysphagia with associated events of what she reports as "choking" and regurgitation. No significant weight loss, hematemesis, melena, or hematochezia.    Past Endo History:  EGD 6/2018  Normal examined duodenum.  Erythematous mucosa in the gastric body, antrum and prepyloric region of the stomach. Biopsied.  3 cm hiatal hernia. Biopsied.  Mild Schatzki ring. Dilated.  The examination was suspicious for achalasia.  Pathology:  STOMACH, BIOPSY:  Gastric body and antral mucosa with mild chronic gastritis and reactive changes  No evidence of intestinal metaplasia, dysplasia or malignancy  No evidence of Helicobacter pylori organisms on H&E stain  PROXIMAL AND DISTAL ESOPHAGUS, BIOPSY:  Squamous mucosa with mild chronic inflammation and reactive changes  No evidence of intestinal metaplasia, dysplasia or malignancy  Few eosinophils present (upto 6/HPF)    Colon 6/2018  The examined portion of the ileum was " normal.  Diverticulosis in the recto-sigmoid colon, in the sigmoid colon, in the descending colon and in the transverse colon.  Non-bleeding internal hemorrhoids.  The examination was otherwise normal.  No specimens collected.  Recommendations repeat in 5 years    EGD 2/2018  Normal examined duodenum.  One gastric polyp. Biopsied.  Erythematous mucosa in the gastric fundus, gastric body, antrum, prepyloric region of the stomach and pylorus. Biopsied.  3 cm hiatal hernia. Biopsied.  Tortuous esophagus.  Pathology:  PREPYLORIC SMALL POLYP BIOPSY:  BENIGN POLYPOID GASTRIC MUCOSA WITH HYPERPLASTIC CHANGE.  NO INTESTINAL METAPLASIA OR DYSPLASIA IDENTIFIED.  RANDOM STOMACH BIOPSY:  MILD CHRONIC GASTRITIS WITH FOCAL GLANDULAR DILATION.  IMMUNOSTAIN FOR H. PYLORI IS NEGATIVE, WITH PROPER POSITIVE AND NEGATIVE CONTROLS.  NO INTESTINAL METAPLASIA OR DYSPLASIA IDENTIFIED.  ESOPHAGUS BIOPSY:  BENIGN ESOPHAGEAL MUCOSA.  NO EOSINOPHILIC ESOPHAGITIS OR DYSPLASIA IDENTIFIED.    Prior Motility Studies:  High resolution manometry 4/2018  Findings: multiple abnormalities seen on this study. the LES is hypertensive, but it relaxes. basal LES 62, IRP 10. in the esophageal body there are fragmented contractions in greater than 50%. on a number of swallows, the distal esophagus has impressive high amplitude contractions ( almost 300 ) with prolonged duration. many double peaked contractions. increased DCI 5363. on some swallows in the mid-esophagus, there is no contraction at all.  abnormal bolus clearance in nearly half of swallows  Impression: Manometry indicating non-specific esophageal motor disorder.    Review of Systems:  Constitutional: no fever, chills or change in weight   Eyes: no visual changes   ENT: no sore throat or dysphagia  Respiratory: no cough or shortness of breath   Cardiovascular: no chest pain or palpitations   Gastrointestinal: as per HPI  Hematologic/Lymphatic: no easy bruising or lymphadenopathy    Musculoskeletal: no arthralgias or myalgias   Neurological: no change in mental status  Behavioral/Psych: no change in mood      Past Medical History:   Diagnosis Date    Asthma     Diabetes mellitus     Hypertension     Migraine headache     Stroke 2016    Thyroid disease        Past Surgical History:   Procedure Laterality Date    APPENDECTOMY       SECTION      CHOLECYSTECTOMY      HYSTERECTOMY      UMBILICAL HERNIA REPAIR         Family History   Problem Relation Age of Onset    Hepatitis Mother     Cancer Mother     Hepatitis Father     Cancer Father        Social History     Socioeconomic History    Marital status:      Spouse name: Not on file    Number of children: Not on file    Years of education: Not on file    Highest education level: Not on file   Social Needs    Financial resource strain: Not on file    Food insecurity - worry: Not on file    Food insecurity - inability: Not on file    Transportation needs - medical: Not on file    Transportation needs - non-medical: Not on file   Occupational History    Not on file   Tobacco Use    Smoking status: Never Smoker    Smokeless tobacco: Never Used   Substance and Sexual Activity    Alcohol use: No    Drug use: No    Sexual activity: No   Other Topics Concern    Not on file   Social History Narrative    Not on file       Current Outpatient Medications on File Prior to Visit   Medication Sig Dispense Refill    calcium carbonate (TUMS) 200 mg calcium (500 mg) chewable tablet Take 1 tablet by mouth once daily.      citalopram (CELEXA) 40 MG tablet Take 40 mg by mouth once daily.      codeine-butalbital-ASA-caffeine (BUTALBITAL COMPOUND-CODEINE) 35--40 mg Cap Take 1 capsule by mouth every 4 (four) hours as needed.      fluticasone (FLONASE) 50 mcg/actuation nasal spray 1 spray by Each Nare route once daily.      glyBURIDE (DIABETA) 5 MG tablet Take 5 mg by mouth daily with breakfast.       "hyoscyamine (LEVSIN/SL) 0.125 mg Subl Place 2 tablets (0.25 mg total) under the tongue every 4 (four) hours as needed. 30 tablet 3    insulin aspart protamine-insulin aspart (NOVOLOG 70/30) 100 unit/mL (70-30) InPn pen Inject into the skin 2 (two) times daily before meals.      levothyroxine (SYNTHROID) 25 MCG tablet Take 25 mcg by mouth once daily.      lisinopril-hydrochlorothiazide (PRINZIDE,ZESTORETIC) 10-12.5 mg per tablet Take 1 tablet by mouth once daily.      metFORMIN (GLUCOPHAGE) 850 MG tablet Take 850 mg by mouth 2 (two) times daily with meals.      pantoprazole (PROTONIX) 40 MG tablet Take 1 tablet (40 mg total) by mouth once daily. 30 tablet 12    pravastatin (PRAVACHOL) 10 MG tablet Take 10 mg by mouth once daily.      ranitidine (ZANTAC) 150 MG tablet Take 150 mg by mouth 2 (two) times daily.       No current facility-administered medications on file prior to visit.        Review of patient's allergies indicates:   Allergen Reactions    Diazepam Hallucinations       Physical Exam:  /84   Pulse 91   Ht 5' 5" (1.651 m)   Wt 98 kg (216 lb)   BMI 35.94 kg/m²   General appearance: alert, appears stated age and cooperative  Back: symmetric, no curvature. ROM normal. No CVA tenderness.  Lungs: clear to auscultation bilaterally  Chest wall: no tenderness  Heart: regular rate and rhythm, S1, S2 normal, no murmur, click, rub or gallop  Abdomen: soft, non-tender; bowel sounds normal; no masses,  no organomegaly  Extremities: extremities normal, atraumatic, no cyanosis or edema  Pulses: 2+ and symmetric    Laboratory:  Lab Results   Component Value Date    WBC 7.53 04/12/2018    HGB 14.2 04/12/2018    HCT 44.8 04/12/2018    MCV 84 04/12/2018     04/12/2018     CMP  Sodium   Date Value Ref Range Status   04/12/2018 140 136 - 145 mmol/L Final     Potassium   Date Value Ref Range Status   04/12/2018 4.4 3.5 - 5.1 mmol/L Final     Chloride   Date Value Ref Range Status   04/12/2018 101 95 - " 110 mmol/L Final     CO2   Date Value Ref Range Status   04/12/2018 30 (H) 23 - 29 mmol/L Final     Glucose   Date Value Ref Range Status   04/12/2018 147 (H) 70 - 110 mg/dL Final     BUN, Bld   Date Value Ref Range Status   04/12/2018 11 6 - 20 mg/dL Final     Creatinine   Date Value Ref Range Status   04/12/2018 0.8 0.5 - 1.4 mg/dL Final     Calcium   Date Value Ref Range Status   04/12/2018 9.9 8.7 - 10.5 mg/dL Final     Total Protein   Date Value Ref Range Status   04/12/2018 7.9 6.0 - 8.4 g/dL Final     Albumin   Date Value Ref Range Status   04/12/2018 3.8 3.5 - 5.2 g/dL Final     Total Bilirubin   Date Value Ref Range Status   04/12/2018 0.3 0.1 - 1.0 mg/dL Final     Comment:     For infants and newborns, interpretation of results should be based  on gestational age, weight and in agreement with clinical  observations.  Premature Infant recommended reference ranges:  Up to 24 hours.............<8.0 mg/dL  Up to 48 hours............<12.0 mg/dL  3-5 days..................<15.0 mg/dL  6-29 days.................<15.0 mg/dL       Alkaline Phosphatase   Date Value Ref Range Status   04/12/2018 145 (H) 55 - 135 U/L Final     AST   Date Value Ref Range Status   04/12/2018 29 10 - 40 U/L Final     ALT   Date Value Ref Range Status   04/12/2018 32 10 - 44 U/L Final     Anion Gap   Date Value Ref Range Status   04/12/2018 9 8 - 16 mmol/L Final     eGFR if    Date Value Ref Range Status   04/12/2018 >60.0 >60 mL/min/1.73 m^2 Final     eGFR if non    Date Value Ref Range Status   04/12/2018 >60.0 >60 mL/min/1.73 m^2 Final     Comment:     Calculation used to obtain the estimated glomerular filtration  rate (eGFR) is the CKD-EPI equation.          Imaging:  Esophogram 2/2015  The esophagus is normal in contour and caliber without evidence of masses or strictures. Esophageal dysmotility with tertiary contractions observed. No gastroesophageal reflux occurred during this exam.  No hiatal  hernia demonstrated.  The gastric cardia is unremarkable in appearance.    Assessment:  53 year old female with a history of HTN, HLD, Hypothyroidism, and DM who presents for a follow up.    Plan:  Esophageal dysphagia   When we initially met patient in the ED in February she complain of ongoing issues for 3+ years which more than 15 dilation and no true improvement. She had an EGD that same evening which showed a small hiatal hernia with a torturous esophagus and an esophagram the following day she showed 3 contractions. This 2 studies were followed by a manometry which was remarkable for a hypercontractile esophagus. At this point she was still on a PPI/H2 blocker and her Levsin was increase (initially with some response but now its seems that it truly isn't working). She then had a repeat EGD with dilation up to 18 mm (no true improvement) and a colonoscopy. It was during this studies that there is mention of suspicion for achalasia. With this new suspicion all her studies including her manometry were re-examine. At this point her studies don't point to a classic achalasia picture (not to mention that she has a HH which in the setting of achalasia is very rare) but rather a non-specific hypercontractile disorder. Of note we are not disregarding the fact that this could also possible represent evolving achalasia. As far as the next step we considered CCB but in the setting of her hypertension and being on combined agents if would prove a bit difficult to alter her medications. We therefore opted for EGD with botox and monitoring her response afterwards. Depending on how she does with this intervention we will have to determine need for repeat testing as well as further referral but we are optimist that this might provide improvement for the patient.   --Decrease PPI to QDaily and continue Zantac (not sure that this is truly helping and might completely take her off)  --Continue using Levsin as needed (she is not  sure that this is even working but does have medication around just in case  --EGD with botox at the LES (will check in with patient post procedure to see how she responds and what else we need to do)  --Follow up in 3 months or sooner if issues arise with Dr. Magana as patient is Frisian speaking    Colon cancer screening  --Repeat colonoscopy in 5 years based on history of polyps, due in 2023     Hypertension, T2DM, Hypothyroidism, Asthma  --Patient is now seeing a PCP in [place] therefore I asked her to please call us back with information in order to have it on file    No orders of the defined types were placed in this encounter.    Rafia Magana M.D.  Gastroenterology Fellow, PGY-V  Pager: [phone]  [facility]

## 2018-08-16 NOTE — PROGRESS NOTES
"     Gastroenterology Clinic    Reason for visit: The encounter diagnosis was Dysphagia, unspecified type.  Referring provider/PCP: Primary Doctor No    HPI:  53 year old female with a history of HTN, HLD, Hypothyroidism, and DM who presents for a follow up.    Patient well known to me as I have seen in her in the inpatient setting as well as in clinic. Last seen in clinic in April and since then has had repeat EGD/colon with results below under Endo History.Today she presents by herself and is quite tearful as she feels that she cannot live like this any longer. She feels that she did not obtain any benefit from the esophageal dilation performed on June. Furthermore she feels that she is worsening as she no longer gets any benefit from Levsin (still on PPI and H2 blocker but truly not helping). She also needs to be careful with her posture because she feels that even bending down to grab something brings up the food. She is also now blending her food however even if this lifestyle modifications she is experiencing dysphagia with associated events of what she reports as "choking" and regurgitation. No significant weight loss, hematemesis, melena, or hematochezia.    Past Endo History:  EGD 6/2018  Normal examined duodenum.  Erythematous mucosa in the gastric body, antrum and prepyloric region of the stomach. Biopsied.  3 cm hiatal hernia. Biopsied.  Mild Schatzki ring. Dilated.  The examination was suspicious for achalasia.  Pathology:  STOMACH, BIOPSY:  Gastric body and antral mucosa with mild chronic gastritis and reactive changes  No evidence of intestinal metaplasia, dysplasia or malignancy  No evidence of Helicobacter pylori organisms on H&E stain  PROXIMAL AND DISTAL ESOPHAGUS, BIOPSY:  Squamous mucosa with mild chronic inflammation and reactive changes  No evidence of intestinal metaplasia, dysplasia or malignancy  Few eosinophils present (upto 6/HPF)    Colon 6/2018  The examined portion of the ileum was " normal.  Diverticulosis in the recto-sigmoid colon, in the sigmoid colon, in the descending colon and in the transverse colon.  Non-bleeding internal hemorrhoids.  The examination was otherwise normal.  No specimens collected.  Recommendations repeat in 5 years    EGD 2/2018  Normal examined duodenum.  One gastric polyp. Biopsied.  Erythematous mucosa in the gastric fundus, gastric body, antrum, prepyloric region of the stomach and pylorus. Biopsied.  3 cm hiatal hernia. Biopsied.  Tortuous esophagus.  Pathology:  PREPYLORIC SMALL POLYP BIOPSY:  BENIGN POLYPOID GASTRIC MUCOSA WITH HYPERPLASTIC CHANGE.  NO INTESTINAL METAPLASIA OR DYSPLASIA IDENTIFIED.  RANDOM STOMACH BIOPSY:  MILD CHRONIC GASTRITIS WITH FOCAL GLANDULAR DILATION.  IMMUNOSTAIN FOR H. PYLORI IS NEGATIVE, WITH PROPER POSITIVE AND NEGATIVE CONTROLS.  NO INTESTINAL METAPLASIA OR DYSPLASIA IDENTIFIED.  ESOPHAGUS BIOPSY:  BENIGN ESOPHAGEAL MUCOSA.  NO EOSINOPHILIC ESOPHAGITIS OR DYSPLASIA IDENTIFIED.    Prior Motility Studies:  High resolution manometry 4/2018  Findings: multiple abnormalities seen on this study. the LES is hypertensive, but it relaxes. basal LES 62, IRP 10. in the esophageal body there are fragmented contractions in greater than 50%. on a number of swallows, the distal esophagus has impressive high amplitude contractions ( almost 300 ) with prolonged duration. many double peaked contractions. increased DCI 5363. on some swallows in the mid-esophagus, there is no contraction at all.  abnormal bolus clearance in nearly half of swallows  Impression: Manometry indicating non-specific esophageal motor disorder.    Review of Systems:  Constitutional: no fever, chills or change in weight   Eyes: no visual changes   ENT: no sore throat or dysphagia  Respiratory: no cough or shortness of breath   Cardiovascular: no chest pain or palpitations   Gastrointestinal: as per HPI  Hematologic/Lymphatic: no easy bruising or lymphadenopathy    Musculoskeletal: no arthralgias or myalgias   Neurological: no change in mental status  Behavioral/Psych: no change in mood      Past Medical History:   Diagnosis Date    Asthma     Diabetes mellitus     Hypertension     Migraine headache     Stroke 2016    Thyroid disease        Past Surgical History:   Procedure Laterality Date    APPENDECTOMY       SECTION      CHOLECYSTECTOMY      HYSTERECTOMY      UMBILICAL HERNIA REPAIR         Family History   Problem Relation Age of Onset    Hepatitis Mother     Cancer Mother     Hepatitis Father     Cancer Father        Social History     Socioeconomic History    Marital status:      Spouse name: Not on file    Number of children: Not on file    Years of education: Not on file    Highest education level: Not on file   Social Needs    Financial resource strain: Not on file    Food insecurity - worry: Not on file    Food insecurity - inability: Not on file    Transportation needs - medical: Not on file    Transportation needs - non-medical: Not on file   Occupational History    Not on file   Tobacco Use    Smoking status: Never Smoker    Smokeless tobacco: Never Used   Substance and Sexual Activity    Alcohol use: No    Drug use: No    Sexual activity: No   Other Topics Concern    Not on file   Social History Narrative    Not on file       Current Outpatient Medications on File Prior to Visit   Medication Sig Dispense Refill    calcium carbonate (TUMS) 200 mg calcium (500 mg) chewable tablet Take 1 tablet by mouth once daily.      citalopram (CELEXA) 40 MG tablet Take 40 mg by mouth once daily.      codeine-butalbital-ASA-caffeine (BUTALBITAL COMPOUND-CODEINE) 12--40 mg Cap Take 1 capsule by mouth every 4 (four) hours as needed.      fluticasone (FLONASE) 50 mcg/actuation nasal spray 1 spray by Each Nare route once daily.      glyBURIDE (DIABETA) 5 MG tablet Take 5 mg by mouth daily with breakfast.       "hyoscyamine (LEVSIN/SL) 0.125 mg Subl Place 2 tablets (0.25 mg total) under the tongue every 4 (four) hours as needed. 30 tablet 3    insulin aspart protamine-insulin aspart (NOVOLOG 70/30) 100 unit/mL (70-30) InPn pen Inject into the skin 2 (two) times daily before meals.      levothyroxine (SYNTHROID) 25 MCG tablet Take 25 mcg by mouth once daily.      lisinopril-hydrochlorothiazide (PRINZIDE,ZESTORETIC) 10-12.5 mg per tablet Take 1 tablet by mouth once daily.      metFORMIN (GLUCOPHAGE) 850 MG tablet Take 850 mg by mouth 2 (two) times daily with meals.      pantoprazole (PROTONIX) 40 MG tablet Take 1 tablet (40 mg total) by mouth once daily. 30 tablet 12    pravastatin (PRAVACHOL) 10 MG tablet Take 10 mg by mouth once daily.      ranitidine (ZANTAC) 150 MG tablet Take 150 mg by mouth 2 (two) times daily.       No current facility-administered medications on file prior to visit.        Review of patient's allergies indicates:   Allergen Reactions    Diazepam Hallucinations       Physical Exam:  /84   Pulse 91   Ht 5' 5" (1.651 m)   Wt 98 kg (216 lb)   BMI 35.94 kg/m²   General appearance: alert, appears stated age and cooperative  Back: symmetric, no curvature. ROM normal. No CVA tenderness.  Lungs: clear to auscultation bilaterally  Chest wall: no tenderness  Heart: regular rate and rhythm, S1, S2 normal, no murmur, click, rub or gallop  Abdomen: soft, non-tender; bowel sounds normal; no masses,  no organomegaly  Extremities: extremities normal, atraumatic, no cyanosis or edema  Pulses: 2+ and symmetric    Laboratory:  Lab Results   Component Value Date    WBC 7.53 04/12/2018    HGB 14.2 04/12/2018    HCT 44.8 04/12/2018    MCV 84 04/12/2018     04/12/2018     CMP  Sodium   Date Value Ref Range Status   04/12/2018 140 136 - 145 mmol/L Final     Potassium   Date Value Ref Range Status   04/12/2018 4.4 3.5 - 5.1 mmol/L Final     Chloride   Date Value Ref Range Status   04/12/2018 101 95 - " 110 mmol/L Final     CO2   Date Value Ref Range Status   04/12/2018 30 (H) 23 - 29 mmol/L Final     Glucose   Date Value Ref Range Status   04/12/2018 147 (H) 70 - 110 mg/dL Final     BUN, Bld   Date Value Ref Range Status   04/12/2018 11 6 - 20 mg/dL Final     Creatinine   Date Value Ref Range Status   04/12/2018 0.8 0.5 - 1.4 mg/dL Final     Calcium   Date Value Ref Range Status   04/12/2018 9.9 8.7 - 10.5 mg/dL Final     Total Protein   Date Value Ref Range Status   04/12/2018 7.9 6.0 - 8.4 g/dL Final     Albumin   Date Value Ref Range Status   04/12/2018 3.8 3.5 - 5.2 g/dL Final     Total Bilirubin   Date Value Ref Range Status   04/12/2018 0.3 0.1 - 1.0 mg/dL Final     Comment:     For infants and newborns, interpretation of results should be based  on gestational age, weight and in agreement with clinical  observations.  Premature Infant recommended reference ranges:  Up to 24 hours.............<8.0 mg/dL  Up to 48 hours............<12.0 mg/dL  3-5 days..................<15.0 mg/dL  6-29 days.................<15.0 mg/dL       Alkaline Phosphatase   Date Value Ref Range Status   04/12/2018 145 (H) 55 - 135 U/L Final     AST   Date Value Ref Range Status   04/12/2018 29 10 - 40 U/L Final     ALT   Date Value Ref Range Status   04/12/2018 32 10 - 44 U/L Final     Anion Gap   Date Value Ref Range Status   04/12/2018 9 8 - 16 mmol/L Final     eGFR if    Date Value Ref Range Status   04/12/2018 >60.0 >60 mL/min/1.73 m^2 Final     eGFR if non    Date Value Ref Range Status   04/12/2018 >60.0 >60 mL/min/1.73 m^2 Final     Comment:     Calculation used to obtain the estimated glomerular filtration  rate (eGFR) is the CKD-EPI equation.          Imaging:  Esophogram 2/2015  The esophagus is normal in contour and caliber without evidence of masses or strictures. Esophageal dysmotility with tertiary contractions observed. No gastroesophageal reflux occurred during this exam.  No hiatal  hernia demonstrated.  The gastric cardia is unremarkable in appearance.    Assessment:  53 year old female with a history of HTN, HLD, Hypothyroidism, and DM who presents for a follow up.    Plan:  Esophageal dysphagia   When we initially met patient in the ED in February she complain of ongoing issues for 3+ years which more than 15 dilation and no true improvement. She had an EGD that same evening which showed a small hiatal hernia with a torturous esophagus and an esophagram the following day she showed 3 contractions. This 2 studies were followed by a manometry which was remarkable for a hypercontractile esophagus. At this point she was still on a PPI/H2 blocker and her Levsin was increase (initially with some response but now its seems that it truly isn't working). She then had a repeat EGD with dilation up to 18 mm (no true improvement) and a colonoscopy. It was during this studies that there is mention of suspicion for achalasia. With this new suspicion all her studies including her manometry were re-examine. At this point her studies don't point to a classic achalasia picture (not to mention that she has a HH which in the setting of achalasia is very rare) but rather a non-specific hypercontractile disorder. Of note we are not disregarding the fact that this could also possible represent evolving achalasia. As far as the next step we considered CCB but in the setting of her hypertension and being on combined agents if would prove a bit difficult to alter her medications. We therefore opted for EGD with botox and monitoring her response afterwards. Depending on how she does with this intervention we will have to determine need for repeat testing as well as further referral but we are optimist that this might provide improvement for the patient.   --Decrease PPI to QDaily and continue Zantac (not sure that this is truly helping and might completely take her off)  --Continue using Levsin as needed (she is not  sure that this is even working but does have medication around just in case  --EGD with botox at the LES (will check in with patient post procedure to see how she responds and what else we need to do)  --Follow up in 3 months or sooner if issues arise with Dr. Magana as patient is Vietnamese speaking    Colon cancer screening  --Repeat colonoscopy in 5 years based on history of polyps, due in 2023     Hypertension, T2DM, Hypothyroidism, Asthma  --Patient is now seeing a PCP in [place] therefore I asked her to please call us back with information in order to have it on file    No orders of the defined types were placed in this encounter.    Rafia Magana M.D.  Gastroenterology Fellow, PGY-V  Pager: [phone]  [facility]

## 2018-09-07 ENCOUNTER — HOSPITAL ENCOUNTER (OUTPATIENT)
Facility: HOSPITAL | Age: 53
Discharge: HOME | End: 2018-09-07
Attending: INTERNAL MEDICINE | Admitting: INTERNAL MEDICINE
Payer: COMMERCIAL

## 2018-09-07 ENCOUNTER — HOSPITAL ENCOUNTER (EMERGENCY)
Facility: HOSPITAL | Age: 53
Discharge: HOME | End: 2018-09-07
Attending: EMERGENCY MEDICINE
Payer: COMMERCIAL

## 2018-09-07 ENCOUNTER — ANESTHESIA (OUTPATIENT)
Dept: ENDOSCOPY | Facility: HOSPITAL | Age: 53
End: 2018-09-07
Payer: COMMERCIAL

## 2018-09-07 ENCOUNTER — ANESTHESIA EVENT (OUTPATIENT)
Dept: ENDOSCOPY | Facility: HOSPITAL | Age: 53
End: 2018-09-07
Payer: COMMERCIAL

## 2018-09-07 VITALS
HEIGHT: 65 IN | WEIGHT: 235 LBS | OXYGEN SATURATION: 96 % | RESPIRATION RATE: 18 BRPM | HEART RATE: 85 BPM | DIASTOLIC BLOOD PRESSURE: 63 MMHG | BODY MASS INDEX: 39.15 KG/M2 | SYSTOLIC BLOOD PRESSURE: 108 MMHG | TEMPERATURE: 98 F

## 2018-09-07 VITALS
WEIGHT: 235 LBS | DIASTOLIC BLOOD PRESSURE: 72 MMHG | BODY MASS INDEX: 39.11 KG/M2 | HEART RATE: 79 BPM | SYSTOLIC BLOOD PRESSURE: 127 MMHG | OXYGEN SATURATION: 96 % | TEMPERATURE: 98 F | RESPIRATION RATE: 17 BRPM

## 2018-09-07 DIAGNOSIS — R13.10 DYSPHAGIA: ICD-10-CM

## 2018-09-07 DIAGNOSIS — R53.1 WEAKNESS: ICD-10-CM

## 2018-09-07 DIAGNOSIS — R20.2 TINGLING OF FACE: Primary | ICD-10-CM

## 2018-09-07 DIAGNOSIS — R13.19 ESOPHAGEAL DYSPHAGIA: Primary | ICD-10-CM

## 2018-09-07 DIAGNOSIS — I63.9 STROKE: ICD-10-CM

## 2018-09-07 PROBLEM — R20.0 LEFT SIDED NUMBNESS: Status: ACTIVE | Noted: 2018-09-07

## 2018-09-07 LAB
ALBUMIN SERPL BCP-MCNC: 3.9 G/DL
ALP SERPL-CCNC: 185 U/L
ALT SERPL W/O P-5'-P-CCNC: 84 U/L
ANION GAP SERPL CALC-SCNC: 9 MMOL/L
AST SERPL-CCNC: 64 U/L
BASOPHILS # BLD AUTO: 0.03 K/UL
BASOPHILS NFR BLD: 0.5 %
BILIRUB SERPL-MCNC: 0.6 MG/DL
BUN SERPL-MCNC: 11 MG/DL
CALCIUM SERPL-MCNC: 10.2 MG/DL
CHLORIDE SERPL-SCNC: 102 MMOL/L
CHOLEST SERPL-MCNC: 213 MG/DL
CHOLEST/HDLC SERPL: 4.6 {RATIO}
CO2 SERPL-SCNC: 25 MMOL/L
CREAT SERPL-MCNC: 0.8 MG/DL (ref 0.5–1.4)
CREAT SERPL-MCNC: 0.9 MG/DL
DIFFERENTIAL METHOD: ABNORMAL
EOSINOPHIL # BLD AUTO: 0.1 K/UL
EOSINOPHIL NFR BLD: 2.1 %
ERYTHROCYTE [DISTWIDTH] IN BLOOD BY AUTOMATED COUNT: 14.2 %
EST. GFR  (AFRICAN AMERICAN): >60 ML/MIN/1.73 M^2
EST. GFR  (NON AFRICAN AMERICAN): >60 ML/MIN/1.73 M^2
GLUCOSE SERPL-MCNC: 203 MG/DL
HCT VFR BLD AUTO: 46.8 %
HDLC SERPL-MCNC: 46 MG/DL
HDLC SERPL: 21.6 %
HGB BLD-MCNC: 15.6 G/DL
IMM GRANULOCYTES # BLD AUTO: 0.01 K/UL
IMM GRANULOCYTES NFR BLD AUTO: 0.2 %
INR PPP: 1
LDLC SERPL CALC-MCNC: 126.6 MG/DL
LYMPHOCYTES # BLD AUTO: 1.9 K/UL
LYMPHOCYTES NFR BLD: 33.7 %
MCH RBC QN AUTO: 27.7 PG
MCHC RBC AUTO-ENTMCNC: 33.3 G/DL
MCV RBC AUTO: 83 FL
MONOCYTES # BLD AUTO: 0.3 K/UL
MONOCYTES NFR BLD: 5.6 %
NEUTROPHILS # BLD AUTO: 3.3 K/UL
NEUTROPHILS NFR BLD: 57.9 %
NONHDLC SERPL-MCNC: 167 MG/DL
NRBC BLD-RTO: 0 /100 WBC
PLATELET # BLD AUTO: 170 K/UL
PMV BLD AUTO: 12 FL
POC PTINR: 1 (ref 0.9–1.2)
POC PTWBT: 12 SEC (ref 9.7–14.3)
POCT GLUCOSE: 209 MG/DL (ref 70–110)
POCT GLUCOSE: 223 MG/DL (ref 70–110)
POTASSIUM SERPL-SCNC: 4.8 MMOL/L
PROT SERPL-MCNC: 8.7 G/DL
PROTHROMBIN TIME: 10.1 SEC
RBC # BLD AUTO: 5.64 M/UL
SAMPLE: NORMAL
SAMPLE: NORMAL
SODIUM SERPL-SCNC: 136 MMOL/L
TRIGL SERPL-MCNC: 202 MG/DL
TSH SERPL DL<=0.005 MIU/L-ACNC: 2.07 UIU/ML
WBC # BLD AUTO: 5.67 K/UL

## 2018-09-07 PROCEDURE — 80061 LIPID PANEL: CPT

## 2018-09-07 PROCEDURE — 85610 PROTHROMBIN TIME: CPT

## 2018-09-07 PROCEDURE — 82565 ASSAY OF CREATININE: CPT

## 2018-09-07 PROCEDURE — 99285 EMERGENCY DEPT VISIT HI MDM: CPT | Mod: 25

## 2018-09-07 PROCEDURE — 25500020 PHARM REV CODE 255: Performed by: EMERGENCY MEDICINE

## 2018-09-07 PROCEDURE — 63600175 PHARM REV CODE 636 W HCPCS: Performed by: NURSE ANESTHETIST, CERTIFIED REGISTERED

## 2018-09-07 PROCEDURE — 84443 ASSAY THYROID STIM HORMONE: CPT

## 2018-09-07 PROCEDURE — E9220 PRA ENDO ANESTHESIA: HCPCS | Mod: ,,, | Performed by: NURSE ANESTHETIST, CERTIFIED REGISTERED

## 2018-09-07 PROCEDURE — A9585 GADOBUTROL INJECTION: HCPCS | Performed by: EMERGENCY MEDICINE

## 2018-09-07 PROCEDURE — 93010 ELECTROCARDIOGRAM REPORT: CPT | Mod: ,,, | Performed by: INTERNAL MEDICINE

## 2018-09-07 PROCEDURE — 99285 EMERGENCY DEPT VISIT HI MDM: CPT | Mod: ,,, | Performed by: EMERGENCY MEDICINE

## 2018-09-07 PROCEDURE — 37000008 HC ANESTHESIA 1ST 15 MINUTES: Performed by: INTERNAL MEDICINE

## 2018-09-07 PROCEDURE — 82962 GLUCOSE BLOOD TEST: CPT | Performed by: INTERNAL MEDICINE

## 2018-09-07 PROCEDURE — 27201028 HC NEEDLE, SCLERO: Performed by: INTERNAL MEDICINE

## 2018-09-07 PROCEDURE — 80053 COMPREHEN METABOLIC PANEL: CPT

## 2018-09-07 PROCEDURE — 25000003 PHARM REV CODE 250: Performed by: NURSE ANESTHETIST, CERTIFIED REGISTERED

## 2018-09-07 PROCEDURE — 63600175 PHARM REV CODE 636 W HCPCS: Mod: JG | Performed by: INTERNAL MEDICINE

## 2018-09-07 PROCEDURE — 43249 ESOPH EGD DILATION <30 MM: CPT | Performed by: INTERNAL MEDICINE

## 2018-09-07 PROCEDURE — 99284 EMERGENCY DEPT VISIT MOD MDM: CPT | Mod: ,,, | Performed by: PHYSICIAN ASSISTANT

## 2018-09-07 PROCEDURE — 43249 ESOPH EGD DILATION <30 MM: CPT | Mod: ,,, | Performed by: INTERNAL MEDICINE

## 2018-09-07 PROCEDURE — C1726 CATH, BAL DIL, NON-VASCULAR: HCPCS | Performed by: INTERNAL MEDICINE

## 2018-09-07 PROCEDURE — 85025 COMPLETE CBC W/AUTO DIFF WBC: CPT

## 2018-09-07 PROCEDURE — 37000009 HC ANESTHESIA EA ADD 15 MINS: Performed by: INTERNAL MEDICINE

## 2018-09-07 RX ORDER — GADOBUTROL 604.72 MG/ML
10 INJECTION INTRAVENOUS
Status: COMPLETED | OUTPATIENT
Start: 2018-09-07 | End: 2018-09-07

## 2018-09-07 RX ORDER — PROPOFOL 10 MG/ML
VIAL (ML) INTRAVENOUS CONTINUOUS PRN
Status: DISCONTINUED | OUTPATIENT
Start: 2018-09-07 | End: 2018-09-07

## 2018-09-07 RX ORDER — SODIUM CHLORIDE 9 MG/ML
INJECTION, SOLUTION INTRAVENOUS CONTINUOUS PRN
Status: DISCONTINUED | OUTPATIENT
Start: 2018-09-07 | End: 2018-09-07

## 2018-09-07 RX ORDER — SODIUM CHLORIDE 9 MG/ML
INJECTION, SOLUTION INTRAVENOUS CONTINUOUS
Status: DISCONTINUED | OUTPATIENT
Start: 2018-09-07 | End: 2018-09-07 | Stop reason: HOSPADM

## 2018-09-07 RX ORDER — LIDOCAINE HCL/PF 100 MG/5ML
SYRINGE (ML) INTRAVENOUS
Status: DISCONTINUED | OUTPATIENT
Start: 2018-09-07 | End: 2018-09-07

## 2018-09-07 RX ORDER — PROPOFOL 10 MG/ML
VIAL (ML) INTRAVENOUS
Status: DISCONTINUED | OUTPATIENT
Start: 2018-09-07 | End: 2018-09-07

## 2018-09-07 RX ORDER — GLYCOPYRROLATE 0.2 MG/ML
INJECTION INTRAMUSCULAR; INTRAVENOUS
Status: DISCONTINUED | OUTPATIENT
Start: 2018-09-07 | End: 2018-09-07

## 2018-09-07 RX ADMIN — LIDOCAINE HYDROCHLORIDE 100 MG: 20 INJECTION, SOLUTION INTRAVENOUS at 03:09

## 2018-09-07 RX ADMIN — ONABOTULINUMTOXINA 100 UNITS: 100 INJECTION, POWDER, LYOPHILIZED, FOR SOLUTION INTRADERMAL; INTRAMUSCULAR at 03:09

## 2018-09-07 RX ADMIN — PROPOFOL 150 MCG/KG/MIN: 10 INJECTION, EMULSION INTRAVENOUS at 03:09

## 2018-09-07 RX ADMIN — SODIUM CHLORIDE: 0.9 INJECTION, SOLUTION INTRAVENOUS at 03:09

## 2018-09-07 RX ADMIN — GADOBUTROL 10 ML: 604.72 INJECTION INTRAVENOUS at 08:09

## 2018-09-07 RX ADMIN — GLYCOPYRROLATE 0.2 MG: 0.2 INJECTION, SOLUTION INTRAMUSCULAR; INTRAVENOUS at 03:09

## 2018-09-07 RX ADMIN — PROPOFOL 70 MG: 10 INJECTION, EMULSION INTRAVENOUS at 03:09

## 2018-09-07 NOTE — OR NURSING
Dr Morgan informed that patient was experiencing tingling in left side of face. No orders received.

## 2018-09-07 NOTE — OR NURSING
Patient was ready to be discharged and complained of tingling sensation on left side of head ( patient described it as ants biting and that this was a similar sensation she had before she had her stroke that affected her on her left side.  Informed Dr. Walton.

## 2018-09-07 NOTE — DISCHARGE INSTRUCTIONS

## 2018-09-07 NOTE — OR NURSING
Dr. Walton at bedside and he states he will have patient transferred to ER for stroke evaluation.  Dr. Morgan called to ask whether botox could cause the tingling.

## 2018-09-07 NOTE — ED TRIAGE NOTES
Pt was in endoscopy getting a EGD doen with botox and began having tingling to left side of face. Hx of stroke with left sided weakness noted. Pt complaining of headache.

## 2018-09-07 NOTE — ED PROVIDER NOTES
Encounter Date: 2018    SCRIBE #1 NOTE: Fernando REDMOND, am scribing for, and in the presence of, Dr. Eli.       History     Chief Complaint   Patient presents with    Tingling     to left side of face after EGD procedure ths AM. Hx of stroke. Same symptoms as previous stroke     Time patient was seen by the provider: 5:40 PM    The patient is a 53 y.o. female with co-morbidities including: HTN, DM, asthma, thyroid disease, and stroke who presents to the ED with a complaint of mild tingling to the left side of her face and mild weakness in the left leg that began earlier today. Pt states she was just released from the GI suite here at [facility] after receiving a Botox injection to the esophagus. Shortly after receiving these injections, she began experiencing her symptoms. Associated symptoms include: mild nausea (occurred initially after the endoscopy) and a mild headache. Pt's daughter states she has to use a walker ever since her first stroke that occurred 2 years ago. Denies fever, cough, sore throat, and any urinary complications.         The history is provided by the patient and medical records. The history is limited by a language barrier. A  was used (Dr. Eli).     Review of patient's allergies indicates:   Allergen Reactions    Diazepam Hallucinations     Past Medical History:   Diagnosis Date    Asthma     Diabetes mellitus     Hypertension     Migraine headache     Stroke 2016    Thyroid disease      Past Surgical History:   Procedure Laterality Date    APPENDECTOMY       SECTION      CHOLECYSTECTOMY      COLONOSCOPY N/A 2018    Procedure: COLONOSCOPY;  Surgeon: Lorenzo Howard MD;  Location: [facility] ([address] [place] [address]);  Service: Endoscopy;  Laterality: N/A;  No PM Prep;  Japanese speaking; daughter who can translate will be with her    COLONOSCOPY N/A 2018    Performed by Lorenzo Howard MD at [facility] ([address] [place] [address])     ESOPHAGOGASTRODUODENOSCOPY N/A 6/1/2018    Procedure: ESOPHAGOGASTRODUODENOSCOPY (EGD);  Surgeon: Lorenzo Howard MD;  Location: [facility] (4TH FLR);  Service: Endoscopy;  Laterality: N/A;    ESOPHAGOGASTRODUODENOSCOPY (EGD) N/A 6/1/2018    Performed by Lorenzo Howard MD at [facility] (4TH FLR)    ESOPHAGOGASTRODUODENOSCOPY (EGD) N/A 2/4/2018    Performed by Lorenzo Howard MD at [facility] (2ND FLR)    HYSTERECTOMY      MANOMETRY-ESOPHAGEAL-WITH IMPEDANCE N/A 4/17/2018    Performed by Joel Cole MD at [facility] (4TH FLR)    UMBILICAL HERNIA REPAIR       Family History   Problem Relation Age of Onset    Hepatitis Mother     Cancer Mother     Hepatitis Father     Cancer Father      Social History     Tobacco Use    Smoking status: Never Smoker    Smokeless tobacco: Never Used   Substance Use Topics    Alcohol use: No    Drug use: No     Review of Systems   Constitutional: Negative for fever.   HENT: Negative for sore throat.    Respiratory: Negative for shortness of breath.    Cardiovascular: Negative for chest pain.   Gastrointestinal: Positive for nausea (reported after the endoscopy).   Genitourinary: Negative for dysuria.   Musculoskeletal: Negative for back pain.   Skin: Negative for rash.   Neurological: Positive for headaches (mild). Negative for weakness.        Mild tingling to the left side of her face and mild weakness in the left leg.   Hematological: Does not bruise/bleed easily.   All other systems reviewed and are negative.      Physical Exam     Initial Vitals [09/07/18 1718]   BP Pulse Resp Temp SpO2   123/70 74 18 98.3 °F (36.8 °C) 97 %      MAP       --         Physical Exam    Nursing note and vitals reviewed.  Constitutional: She appears well-developed and well-nourished. She is not diaphoretic. No distress.   HENT:   Head: Normocephalic and atraumatic.   Eyes: Conjunctivae and EOM are normal. Pupils are equal, round, and reactive to light.   Neck: Normal range of motion.  Neck supple.   Cardiovascular: Normal rate, regular rhythm, normal heart sounds and intact distal pulses.   Pulmonary/Chest: Breath sounds normal. No respiratory distress.   Abdominal: Soft. Bowel sounds are normal. There is no tenderness.   Musculoskeletal: Normal range of motion. She exhibits no edema.   Neurological: She is alert and oriented to person, place, and time.   4/5 strength in the left hand.   Skin: Skin is warm and dry.         ED Course   Procedures  Labs Reviewed   CBC W/ AUTO DIFFERENTIAL   COMPREHENSIVE METABOLIC PANEL   PROTIME-INR   TSH   LIPID PANEL   POCT GLUCOSE   ISTAT PROCEDURE   ISTAT CREATININE     EKG Readings: (Independently Interpreted)   Initial Reading: No STEMI. Rhythm: Normal Sinus Rhythm. Heart Rate: 71. Ectopy: No Ectopy. Conduction: Normal. ST Segments: Normal ST Segments. T Waves: Normal. Axis: Normal. Clinical Impression: Normal Sinus Rhythm       Imaging Results          CT Head Without Contrast (In process)  Result time 09/07/18 17:54:51                 Medical Decision Making:   History:   Old Medical Records: I decided to obtain old medical records.  Clinical Tests:   Lab Tests: Ordered and Reviewed  Radiological Study: Ordered and Reviewed  Medical Tests: Ordered and Reviewed            Scribe Attestation:   Scribe #1: I performed the above scribed service and the documentation accurately describes the services I performed. I attest to the accuracy of the note.               Clinical Impression:   The encounter diagnosis was Stroke.                             See Eli MD  09/08/18 0525

## 2018-09-07 NOTE — ANESTHESIA POSTPROCEDURE EVALUATION
"Anesthesia Post Evaluation    Patient: Dianna Perez As patient is being discharged she complains of left sided paresthesia sensation similar to that of a previous "stroke" two years ago. She has mild residual left hemiparesis after that event which is not worsening now and she has no findings of facial paresis or signs of significant weakness. Glucose is 240 and BP is 108/63 HR 85. 96% SPO2 on RA.  Spoke to ED MD Young who agrees to see patient in ED as a triage patient.     Procedure(s) Performed: Procedure(s) (LRB):  EGD (ESOPHAGOGASTRODUODENOSCOPY) (N/A)    Final Anesthesia Type: general  Patient location during evaluation: PACU  Patient participation: Yes- Able to Participate  Level of consciousness: awake and alert  Post-procedure vital signs: reviewed and stable  Pain management: adequate  Airway patency: patent  PONV status at discharge: No PONV  Anesthetic complications: no      Cardiovascular status: blood pressure returned to baseline  Respiratory status: unassisted  Hydration status: euvolemic  Follow-up not needed.        Visit Vitals  /61 (BP Location: Left arm, Patient Position: Lying)   Pulse 95   Temp 36.5 °C (97.7 °F) (Temporal)   Resp 18   Ht 5' 5" (1.651 m)   Wt 106.6 kg (235 lb)   SpO2 97%   Breastfeeding? No   BMI 39.11 kg/m²       Pain/William Score: Pain Assessment Performed: Yes (9/7/2018  4:06 PM)  Presence of Pain: non-verbal indicators absent (9/7/2018  4:06 PM)  William Score: 10 (9/7/2018  4:13 PM)        "

## 2018-09-07 NOTE — SUBJECTIVE & OBJECTIVE
Past Medical History:   Diagnosis Date    Asthma     Diabetes mellitus     Hypertension     Migraine headache     Stroke 2016    Thyroid disease      Past Surgical History:   Procedure Laterality Date    APPENDECTOMY       SECTION      CHOLECYSTECTOMY      COLONOSCOPY N/A 2018    Procedure: COLONOSCOPY;  Surgeon: Lorenzo Howard MD;  Location: [facility] (4TH FLR);  Service: Endoscopy;  Laterality: N/A;  No PM Prep;  Vietnamese speaking; daughter who can translate will be with her    COLONOSCOPY N/A 2018    Performed by Lorenzo Howard MD at [facility] (4TH FLR)    ESOPHAGOGASTRODUODENOSCOPY N/A 2018    Procedure: ESOPHAGOGASTRODUODENOSCOPY (EGD);  Surgeon: Lorenzo Howard MD;  Location: [facility] (4TH FLR);  Service: Endoscopy;  Laterality: N/A;    ESOPHAGOGASTRODUODENOSCOPY (EGD) N/A 2018    Performed by Lorenzo Howard MD at [facility] (4TH FLR)    ESOPHAGOGASTRODUODENOSCOPY (EGD) N/A 2018    Performed by Lorenzo Howard MD at [facility] (2ND FLR)    HYSTERECTOMY      MANOMETRY-ESOPHAGEAL-WITH IMPEDANCE N/A 2018    Performed by Joel Cole MD at [facility] (4TH FLR)    UMBILICAL HERNIA REPAIR       Family History   Problem Relation Age of Onset    Hepatitis Mother     Cancer Mother     Hepatitis Father     Cancer Father      Social History     Tobacco Use    Smoking status: Never Smoker    Smokeless tobacco: Never Used   Substance Use Topics    Alcohol use: No    Drug use: No     Review of patient's allergies indicates:   Allergen Reactions    Diazepam Hallucinations       Medications: I have reviewed the current medication administration record.      (Not in a hospital admission)    Review of Systems   Constitutional: Negative for chills and fever.   HENT: Positive for trouble swallowing (followed by GI ). Negative for drooling.    Eyes: Negative for visual disturbance.   Respiratory: Negative for shortness of breath.    Cardiovascular:  Negative for chest pain.   Gastrointestinal:        Esophageal issues prompting EGD and botox treatment today    Musculoskeletal: Positive for gait problem.   Skin: Negative for pallor.   Allergic/Immunologic:        No recent travel outside of the country    Neurological: Positive for weakness and numbness.   Hematological: Does not bruise/bleed easily.   Psychiatric/Behavioral: Negative for agitation and confusion.     Objective:     Vital Signs (Most Recent):  Temp: 98.3 °F (36.8 °C) (09/07/18 1718)  Pulse: 79 (09/07/18 1802)  Resp: 16 (09/07/18 1802)  BP: 109/73 (09/07/18 1802)  SpO2: 97 % (09/07/18 1802)    Vital Signs Range (Last 24H):  Temp:  [97 °F (36.1 °C)-98.3 °F (36.8 °C)]   Pulse:  [74-95]   Resp:  [16-18]   BP: ()/(51-73)   SpO2:  [94 %-98 %]     Physical Exam   Constitutional: She appears well-developed and well-nourished.   HENT:   Head: Normocephalic and atraumatic.   Eyes: EOM are normal. Pupils are equal, round, and reactive to light.   Cardiovascular: Normal rate.   Pulmonary/Chest: Effort normal.   Abdominal: She exhibits no distension.   Musculoskeletal: Normal range of motion. She exhibits no edema.   Neurological: She is alert.   Skin: Skin is warm and dry.   Psychiatric: She has a normal mood and affect. Her behavior is normal.   Nursing note and vitals reviewed.      Neurological Exam:   LOC: alert  Attention Span: Good   Language: No aphasia  Articulation: No dysarthria  Orientation: Person, Place, Time   Visual Fields: Full  EOM (CN III, IV, VI): Full/intact  Facial Sensation (CN V): Facial sensory loss  Facial Movement (CN VII): Symmetric facial expression    Gag Reflex: present  Motor: Arm left  Normal 5/5  Leg left  Paresis: 4/5  Arm right  Normal 5/5  Leg right Normal 5/5  Cebellar: No evidence of appendicular or axial ataxia  Sensation: Cuco-hypoesthesia left  Tone: Normal tone throughout      Laboratory:  CMP:   Recent Labs   Lab  09/07/18   1742   CALCIUM  10.2   ALBUMIN  3.9    PROT  8.7*   NA  136   K  4.8   CO2  25   CL  102   BUN  11   CREATININE  0.9   ALKPHOS  185*   ALT  84*   AST  64*   BILITOT  0.6     CBC:   Recent Labs   Lab  09/07/18   1742   WBC  5.67   RBC  5.64*   HGB  15.6   HCT  46.8   PLT  170   MCV  83   MCH  27.7   MCHC  33.3     Lipid Panel:   Recent Labs   Lab  09/07/18   1742   CHOL  213*   LDLCALC  126.6   HDL  46   TRIG  202*     Coagulation:   Recent Labs   Lab  09/07/18   1742   INR  1.0     Hgb A1C: No results for input(s): HGBA1C in the last 168 hours.  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

## 2018-09-07 NOTE — HPI
"53 year old female with pmh significant for prior stroke (2years ago with post stroke deficits defined as "migraines"), HTN, DM and thyroid disease who was seen today for EGD with botox for esophageal dysmotility under general anesthesia. The patient complained of left sided face tingling upon waking from procedure just prior to dc at which point she was sent to the ER for stroke evaluation given her prior history of stroke.   The patient reports additionally that she has had 3 days of tingling/decreased sensation in the left arm and leg. Reports some weakness in the leg x 10 days but no falls or injuries. Denies speech changes or vision changes     The patient is not currently taking ASA or blood thinners  Denies metal in body (besides teeth) and also denies prior brain issues such as bleeding/ seizures besides stroke.     Patients history and exam was performed with [employer] services.     "

## 2018-09-07 NOTE — ANESTHESIA PREPROCEDURE EVALUATION
09/07/2018  Dianna Perez is a 53 y.o., female.    Anesthesia Evaluation    I have reviewed the Patient Summary Reports.     I have reviewed the Medications.     Review of Systems  Anesthesia Hx:   Denies Personal Hx of Anesthesia complications.   Hematology/Oncology:  Hematology Normal   Oncology Normal     EENT/Dental:EENT/Dental Normal   Cardiovascular:   Hypertension    Pulmonary:   Asthma    Renal/:  Renal/ Normal     Hepatic/GI:  Hepatic/GI Normal    Musculoskeletal:  Musculoskeletal Normal    Neurological:   CVA    Endocrine:   Diabetes Hypothyroidism    Dermatological:  Skin Normal    Psych:  Psychiatric Normal           Physical Exam  General:  Well nourished    Airway/Jaw/Neck:  Airway Findings: Mouth Opening: Normal Tongue: Normal  General Airway Assessment: Adult  Mallampati: II  Improves to II with phonation.  TM Distance: Normal, at least 6 cm        Eyes/Ears/Nose:  EYES/EARS/NOSE FINDINGS: Normal   Dental:  Dental Findings: upper partial dentures   Chest/Lungs:  Chest/Lungs Findings: Clear to auscultation, Normal Respiratory Rate     Heart/Vascular:  Heart Findings: Rate: Normal  Rhythm: Regular Rhythm  Sounds: Normal  Heart murmur: negative Vascular Findings: Normal    Abdomen:  Abdomen Findings: Normal    Musculoskeletal:  Musculoskeletal Findings: Normal   Skin:  Skin Findings: Normal    Mental Status:  Mental Status Findings: Normal        Anesthesia Plan  Type of Anesthesia, risks & benefits discussed:  Anesthesia Type:  general  Patient's Preference: General  Intra-op Monitoring Plan: standard ASA monitors  Intra-op Monitoring Plan Comments:   Post Op Pain Control Plan:   Post Op Pain Control Plan Comments:   Induction:   IV  Beta Blocker:  Patient is not currently on a Beta-Blocker (No further documentation required).       Informed Consent: Patient understands risks and  agrees with Anesthesia plan.  Questions answered. Anesthesia consent signed with patient.  ASA Score: 3     Day of Surgery Review of History & Physical: I have interviewed and examined the patient. I have reviewed the patient's H&P dated:  There are no significant changes.  H&P update referred to the surgeon.         Ready For Surgery From Anesthesia Perspective.

## 2018-09-07 NOTE — [employer] PATIENT INSTRUCTIONS
Discharge Summary/Instructions after an Endoscopic Procedure  Patient Name: Dianna Perez  Patient MRN: [id number]  Patient YOB: 1965 Friday, September 07, 2018  David Morgan MD  RESTRICTIONS:  During your procedure today, you received medications for sedation.  These   medications may affect your judgment, balance and coordination.  Therefore,   for 24 hours, you have the following restrictions:   - DO NOT drive a car, operate machinery, make legal/financial decisions,   sign important papers or drink alcohol.    ACTIVITY:  Today: no heavy lifting, straining or running due to procedural   sedation/anesthesia.  The following day: return to full activity including work.  DIET:  Eat and drink normally unless instructed otherwise.     TREATMENT FOR COMMON SIDE EFFECTS:  - Mild abdominal pain, nausea, belching, bloating or excessive gas:  rest,   eat lightly and use a heating pad.  - Sore Throat: treat with throat lozenges and/or gargle with warm salt   water.  - Because air was used during the procedure, expelling large amounts of air   from your rectum or belching is normal.  - If a bowel prep was taken, you may not have a bowel movement for 1-3 days.    This is normal.  SYMPTOMS TO WATCH FOR AND REPORT TO YOUR PHYSICIAN:  1. Abdominal pain or bloating, other than gas cramps.  2. Chest pain.  3. Back pain.  4. Signs of infection such as: chills or fever occurring within 24 hours   after the procedure.  5. Rectal bleeding, which would show as bright red, maroon, or black stools.   (A tablespoon of blood from the rectum is not serious, especially if   hemorrhoids are present.)  6. Vomiting.  7. Weakness or dizziness.  GO DIRECTLY TO THE NEAREST EMERGENCY ROOM IF YOU HAVE ANY OF THE FOLLOWING:      Difficulty breathing              Chills and/or fever over 101 F   Persistent vomiting and/or vomiting blood   Severe abdominal pain   Severe chest pain   Black, tarry stools   Bleeding- more than one  tablespoon   Any other symptom or condition that you feel may need urgent attention  Your doctor recommends these additional instructions:  If any biopsies were taken, your doctors clinic will contact you in 1 to 2   weeks with any results.  - Patient has a contact number available for emergencies.  The signs and   symptoms of potential delayed complications were discussed with the   patient.  Return to normal activities tomorrow.  Written discharge   instructions were provided to the patient.   - Discharge patient to home.   - Resume previous diet.   - Continue present medications.   - Return to referring physician in 4 weeks to review clinical status.   For questions, problems or results please call your physician - David Morgan MD at Work:  [phone].  [facility], EMERGENCY ROOM PHONE NUMBER: [phone]  IF A COMPLICATION OR EMERGENCY SITUATION ARISES AND YOU ARE UNABLE TO REACH   YOUR PHYSICIAN - GO DIRECTLY TO THE EMERGENCY ROOM.  David Morgan MD  9/7/2018 4:01:56 PM  This report has been verified and signed electronically.  [employer]

## 2018-09-07 NOTE — TRANSFER OF CARE
"Anesthesia Transfer of Care Note    Patient: Dianna Perez    Procedure(s) Performed: Procedure(s) (LRB):  EGD (ESOPHAGOGASTRODUODENOSCOPY) (N/A)    Patient location: PACU    Anesthesia Type: general    Transport from OR: Transported from OR on room air with adequate spontaneous ventilation    Post pain: adequate analgesia    Post assessment: tolerated procedure well and no apparent anesthetic complications    Post vital signs: stable    Level of consciousness: sedated and responds to stimulation    Nausea/Vomiting: no nausea/vomiting    Complications: none    Transfer of care protocol was followed      Last vitals:   Visit Vitals  /62 (BP Location: Left arm, Patient Position: Sitting)   Pulse 75   Temp 36.1 °C (97 °F) (Oral)   Resp 16   Ht 5' 5" (1.651 m)   Wt 106.6 kg (235 lb)   SpO2 97%   Breastfeeding? No   BMI 39.11 kg/m²     "

## 2018-09-07 NOTE — INTERVAL H&P NOTE
The patient has been examined and the H&P has been reviewed:    There is no interval changes since last encounter.    EGD: Dysphagia  Sedation: GA  ASA: Per anesthesia  Mallampati: Per anesthesia    Endoscopy risks, benefits and alternative options discussed and understood by patient/family.          Active Hospital Problems    Diagnosis  POA    Dysphagia [R13.10]  Yes      Resolved Hospital Problems   No resolved problems to display.

## 2018-09-07 NOTE — OR NURSING
Spoke to ER Charge nurse Freida and informed her that will be transferring patient to ER and that Dr. Walton had informed Dr Young that he would be transferring patient to ER for stroke evaluation.  Was instructed by Freida, ER charge nurse that patient should be brought to ER triage.

## 2018-09-08 PROBLEM — B69.0 NEUROCYSTICERCOSIS: Status: ACTIVE | Noted: 2018-09-08

## 2018-09-08 NOTE — CONSULTS
[facility]  Vascular Neurology  Comprehensive Stroke Center  Consult Note    Consults  Assessment/Plan:     Patient is a 53 y.o. 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     Patient with history of stroke  Recommending MRI with and without contrast  Discussed with Dr Alonzo - will be happy to admit if ischemic stroke, however at this time the differential is too broad with atypical lesion.             Mixed hyperlipidemia    Stroke risk factor   If LDL > 70 recommend high dose statin         Type 2 diabetes mellitus without complication, without long-term current use of insulin    Stroke risk factor   Good glucose control recommended         Hypertension    Stroke risk factor  Further guidelines once diagnosis clarified         Esophageal dysphagia    Here initially for procedure today - EGD with botox   Continue meds as per GI             STROKE DOCUMENTATION   Stroke code called at 5:23 pm   Seen by team at 6:05 pm   Radiology interpreted scan at 5:45 pm        NIH Scale:  1a. Level Of Consciousness: 0-->Alert: keenly responsive  1b. LOC Questions: 0-->Answers both questions correctly  1c. LOC Commands: 0-->Performs both tasks correctly  2. Best Gaze: 0-->Normal  3. Visual: 0-->No visual loss  4. Facial Palsy: 0-->Normal symmetrical movements  5a. Motor Arm, Left: 0-->No drift: limb holds 90 (or 45) degrees for full 10 secs  5b. Motor Arm, Right: 0-->No drift: limb holds 90 (or 45) degrees for full 10 secs  6a. Motor Leg, Left: 1-->Drift: leg falls by the end of the 5-sec period but does not hit bed  6b. Motor Leg, Right: 0-->No drift: leg holds 30 degree position for full 5 secs  7. Limb Ataxia: 0-->Absent  8. Sensory: 1-->Mild-to-moderate sensory loss: patient feels pinprick is less sharp or is dull on the affected side: or there is a loss of superficial pain with pinprick,  "but patient is aware of being touched  9. Best Language: 0-->No aphasia: normal  10. Dysarthria: 0-->Normal  11. Extinction and Inattention (formerly Neglect): 0-->No abnormality  Total (NIH Stroke Scale): 2    Modified [place] Score: 0  Sana Coma Scale:    ABCD2 Score:    [id number] Score:   HAS -BLED Score:   ICH Score:0  Hunt & Reeves Classification:       Thrombolysis Candidate? No, Out of window       Interventional Revascularization Candidate?   Is the patient eligible for mechanical endovascular reperfusion (RICHELLE)?  No; No large vessel occlusion      Hemorrhagic change of an Ischemic Stroke: Does this patient have an ischemic stroke with hemorrhagic changes? No     Subjective:     History of Present Illness:  53 year old female with pmh significant for prior stroke (2years ago with post stroke deficits defined as "migraines"), HTN, DM and thyroid disease who was seen today for EGD with botox for esophageal dysmotility under general anesthesia. The patient complained of left sided face tingling upon waking from procedure just prior to dc at which point she was sent to the ER for stroke evaluation given her prior history of stroke.   The patient reports additionally that she has had 3 days of tingling/decreased sensation in the left arm and leg. Reports some weakness in the leg x 10 days but no falls or injuries. Denies speech changes or vision changes     The patient is not currently taking ASA or blood thinners  Denies metal in body (besides teeth) and also denies prior brain issues such as bleeding/ seizures besides stroke.     Patients history and exam was performed with [employer] services.           Past Medical History:   Diagnosis Date    Asthma     Diabetes mellitus     Hypertension     Migraine headache     Stroke 2016    Thyroid disease      Past Surgical History:   Procedure Laterality Date    APPENDECTOMY       SECTION      CHOLECYSTECTOMY      COLONOSCOPY N/A 2018 "    Procedure: COLONOSCOPY;  Surgeon: Lorenzo Howard MD;  Location: [facility] (4TH FLR);  Service: Endoscopy;  Laterality: N/A;  No PM Prep;  Icelandic speaking; daughter who can translate will be with her    COLONOSCOPY N/A 6/1/2018    Performed by Lorenzo Howard MD at [facility] (4TH FLR)    ESOPHAGOGASTRODUODENOSCOPY N/A 6/1/2018    Procedure: ESOPHAGOGASTRODUODENOSCOPY (EGD);  Surgeon: Lorenzo Howard MD;  Location: [facility] (4TH FLR);  Service: Endoscopy;  Laterality: N/A;    ESOPHAGOGASTRODUODENOSCOPY (EGD) N/A 6/1/2018    Performed by Lorenzo Howard MD at [facility] (4TH FLR)    ESOPHAGOGASTRODUODENOSCOPY (EGD) N/A 2/4/2018    Performed by Lorenzo Howard MD at [facility] (2ND FLR)    HYSTERECTOMY      MANOMETRY-ESOPHAGEAL-WITH IMPEDANCE N/A 4/17/2018    Performed by Joel Cole MD at [facility] (4TH FLR)    UMBILICAL HERNIA REPAIR       Family History   Problem Relation Age of Onset    Hepatitis Mother     Cancer Mother     Hepatitis Father     Cancer Father      Social History     Tobacco Use    Smoking status: Never Smoker    Smokeless tobacco: Never Used   Substance Use Topics    Alcohol use: No    Drug use: No     Review of patient's allergies indicates:   Allergen Reactions    Diazepam Hallucinations       Medications: I have reviewed the current medication administration record.      (Not in a hospital admission)    Review of Systems   Constitutional: Negative for chills and fever.   HENT: Positive for trouble swallowing (followed by GI ). Negative for drooling.    Eyes: Negative for visual disturbance.   Respiratory: Negative for shortness of breath.    Cardiovascular: Negative for chest pain.   Gastrointestinal:        Esophageal issues prompting EGD and botox treatment today    Musculoskeletal: Positive for gait problem.   Skin: Negative for pallor.   Allergic/Immunologic:        No recent travel outside of the country    Neurological: Positive for weakness and numbness.    Hematological: Does not bruise/bleed easily.   Psychiatric/Behavioral: Negative for agitation and confusion.     Objective:     Vital Signs (Most Recent):  Temp: 98.3 °F (36.8 °C) (09/07/18 1718)  Pulse: 79 (09/07/18 1802)  Resp: 16 (09/07/18 1802)  BP: 109/73 (09/07/18 1802)  SpO2: 97 % (09/07/18 1802)    Vital Signs Range (Last 24H):  Temp:  [97 °F (36.1 °C)-98.3 °F (36.8 °C)]   Pulse:  [74-95]   Resp:  [16-18]   BP: ()/(51-73)   SpO2:  [94 %-98 %]     Physical Exam   Constitutional: She appears well-developed and well-nourished.   HENT:   Head: Normocephalic and atraumatic.   Eyes: EOM are normal. Pupils are equal, round, and reactive to light.   Cardiovascular: Normal rate.   Pulmonary/Chest: Effort normal.   Abdominal: She exhibits no distension.   Musculoskeletal: Normal range of motion. She exhibits no edema.   Neurological: She is alert.   Skin: Skin is warm and dry.   Psychiatric: She has a normal mood and affect. Her behavior is normal.   Nursing note and vitals reviewed.      Neurological Exam:   LOC: alert  Attention Span: Good   Language: No aphasia  Articulation: No dysarthria  Orientation: Person, Place, Time   Visual Fields: Full  EOM (CN III, IV, VI): Full/intact  Facial Sensation (CN V): Facial sensory loss  Facial Movement (CN VII): Symmetric facial expression    Gag Reflex: present  Motor: Arm left  Normal 5/5  Leg left  Paresis: 4/5  Arm right  Normal 5/5  Leg right Normal 5/5  Cebellar: No evidence of appendicular or axial ataxia  Sensation: Cuco-hypoesthesia left  Tone: Normal tone throughout      Laboratory:  CMP:   Recent Labs   Lab  09/07/18   1742   CALCIUM  10.2   ALBUMIN  3.9   PROT  8.7*   NA  136   K  4.8   CO2  25   CL  102   BUN  11   CREATININE  0.9   ALKPHOS  185*   ALT  84*   AST  64*   BILITOT  0.6     CBC:   Recent Labs   Lab  09/07/18   1742   WBC  5.67   RBC  5.64*   HGB  15.6   HCT  46.8   PLT  170   MCV  83   MCH  27.7   MCHC  33.3     Lipid Panel:   Recent Labs    Lab  09/07/18   1742   CHOL  213*   LDLCALC  126.6   HDL  46   TRIG  202*     Coagulation:   Recent Labs   Lab  09/07/18 1742   INR  1.0     Hgb A1C: No results for input(s): HGBA1C in the last 168 hours.  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

## 2018-09-08 NOTE — ED NOTES
Pt resting on stretcher in NAD. Pt on continuous cardiac monitoring, continuous pulse oximetry, and automatic BP cuff. Pt reports 5/10 frontal headache. Pt denies numbness or tingling to either side of face at this time.

## 2018-09-08 NOTE — ASSESSMENT & PLAN NOTE
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     Patient with history of stroke  Recommending MRI with and without contrast  Discussed with Dr Alonzo - will be happy to admit if ischemic stroke, however at this time the differential is too broad with atypical lesion.

## 2018-09-08 NOTE — ED PROVIDER NOTES
Care discussed with Dr Eli, patient with acute neuro changes after EGD procedure. CT initially concerning for possible bleed, MRI/MRA obtained. 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 Patient stable for discharge home. Discussed findings via  phone. Discharged in good condition.       Gloria Alonzo MD  09/07/18 [id number]

## 2018-09-10 ENCOUNTER — TELEPHONE (OUTPATIENT)
Dept: GASTROENTEROLOGY | Facility: CLINIC | Age: 53
End: 2018-09-10

## 2018-09-10 NOTE — TELEPHONE ENCOUNTER
----- Message from David Morgan MD sent at 9/7/2018  3:26 PM CDT -----  Please schedule patient with  in 1 month (preferably with me). You can add on to her schedule when I'm on.

## 2018-10-29 ENCOUNTER — OFFICE VISIT (OUTPATIENT)
Dept: GASTROENTEROLOGY | Facility: CLINIC | Age: 53
End: 2018-10-29
Payer: COMMERCIAL

## 2018-10-29 VITALS
SYSTOLIC BLOOD PRESSURE: 119 MMHG | HEIGHT: 66 IN | HEART RATE: 86 BPM | WEIGHT: 223.31 LBS | BODY MASS INDEX: 35.89 KG/M2 | DIASTOLIC BLOOD PRESSURE: 71 MMHG

## 2018-10-29 DIAGNOSIS — R13.10 DYSPHAGIA, UNSPECIFIED TYPE: Primary | ICD-10-CM

## 2018-10-29 PROCEDURE — 99999 PR PBB SHADOW E&M-EST. PATIENT-LVL IV: CPT | Mod: PBBFAC,,, | Performed by: STUDENT IN AN ORGANIZED HEALTH CARE EDUCATION/TRAINING PROGRAM

## 2018-10-29 PROCEDURE — 99213 OFFICE O/P EST LOW 20 MIN: CPT | Mod: S$GLB,,, | Performed by: STUDENT IN AN ORGANIZED HEALTH CARE EDUCATION/TRAINING PROGRAM

## 2018-10-29 NOTE — PATIENT INSTRUCTIONS
--Repeat manometry pending results will refer to surgery    --Follow up to be determine    Rafia Magana M.D.  Gastroenterology Fellow, PGY-V  [facility]

## 2018-10-30 ENCOUNTER — TELEPHONE (OUTPATIENT)
Dept: ENDOSCOPY | Facility: HOSPITAL | Age: 53
End: 2018-10-30

## 2018-10-30 NOTE — TELEPHONE ENCOUNTER
Spoke to black who speaks English and explained prep instructions. She will give to grandmother. MARITO

## 2018-10-31 NOTE — PROGRESS NOTES
"     Gastroenterology Clinic    Reason for visit: The encounter diagnosis was Dysphagia, unspecified type.  Referring provider/PCP: Primary Doctor No    HPI:  53 year old female with a history of HTN, HLD, Hypothyroidism, DM, Dysphagia who presents for a follow up.     Patient New Zealander speaking and very well known to me. I last saw her in August with my HPI from that visit below:  Patient well known to me as I have seen in her in the inpatient setting as well as in clinic. Last seen in clinic in April and since then has had repeat EGD/colon with results below under Endo History.Today she presents by herself and is quite tearful as she feels that she cannot live like this any longer. She feels that she did not obtain any benefit from the esophageal dilation performed on June. Furthermore she feels that she is worsening as she no longer gets any benefit from Levsin (still on PPI and H2 blocker but truly not helping). She also needs to be careful with her posture because she feels that even bending down to grab something brings up the food. She is also now blending her food however even if this lifestyle modifications she is experiencing dysphagia with associated events of what she reports as "choking" and regurgitation. No significant weight loss, hematemesis, melena, or hematochezia.    Interval History :  Since her last visit she underwent EGD with Botox which provided some relief for approximately last for 2 weeks. She has now returned to her baseline symptoms. As reported during our last visit she is blending all her foods and feels the PPI/H2/Levsin is not helping. Her symptoms are daily with liquids (no longer on solids) and unpredictable. For example she reports that yesterday she was having slushy slowly through a straw when she felt that she could no longer swallow this resulted in her bring up the slushy which was associated with chest tightness, shortness of breath, and urinary incontinence. She reports " feeling overall worse than when she initially started coming to [facility] and doesn't think she can keep leaving like this.    Past Endo History:  EGD 6/2018  Normal examined duodenum.  Erythematous mucosa in the gastric body, antrum and prepyloric region of the stomach. Biopsied.  3 cm hiatal hernia. Biopsied.  Mild Schatzki ring. Dilated.  The examination was suspicious for achalasia.  Pathology:  STOMACH, BIOPSY:  Gastric body and antral mucosa with mild chronic gastritis and reactive changes  No evidence of intestinal metaplasia, dysplasia or malignancy  No evidence of Helicobacter pylori organisms on H&E stain  PROXIMAL AND DISTAL ESOPHAGUS, BIOPSY:  Squamous mucosa with mild chronic inflammation and reactive changes  No evidence of intestinal metaplasia, dysplasia or malignancy  Few eosinophils present (upto 6/HPF)     Colon 6/2018  The examined portion of the ileum was normal.  Diverticulosis in the recto-sigmoid colon, in the sigmoid colon, in the descending colon and in the transverse colon.  Non-bleeding internal hemorrhoids.  The examination was otherwise normal.  No specimens collected.  Recommendations repeat in 5 years     EGD 2/2018  Normal examined duodenum.  One gastric polyp. Biopsied.  Erythematous mucosa in the gastric fundus, gastric body, antrum, prepyloric region of the stomach and pylorus. Biopsied.  3 cm hiatal hernia. Biopsied.  Tortuous esophagus.  Pathology:  PREPYLORIC SMALL POLYP BIOPSY:  BENIGN POLYPOID GASTRIC MUCOSA WITH HYPERPLASTIC CHANGE.  NO INTESTINAL METAPLASIA OR DYSPLASIA IDENTIFIED.  RANDOM STOMACH BIOPSY:  MILD CHRONIC GASTRITIS WITH FOCAL GLANDULAR DILATION.  IMMUNOSTAIN FOR H. PYLORI IS NEGATIVE, WITH PROPER POSITIVE AND NEGATIVE CONTROLS.  NO INTESTINAL METAPLASIA OR DYSPLASIA IDENTIFIED.  ESOPHAGUS BIOPSY:  BENIGN ESOPHAGEAL MUCOSA.  NO EOSINOPHILIC ESOPHAGITIS OR DYSPLASIA IDENTIFIED.     Prior Motility Studies:  High resolution manometry 4/2018  Findings: multiple  abnormalities seen on this study. the LES is hypertensive, but it relaxes. basal LES 62, IRP 10. in the esophageal body there are fragmented contractions in greater than 50%. on a number of swallows, the distal esophagus has impressive high amplitude contractions ( almost 300 ) with prolonged duration. many double peaked contractions. increased DCI 5363. on some swallows in the mid-esophagus, there is no contraction at all.  abnormal bolus clearance in nearly half of swallows  Impression: Manometry indicating non-specific esophageal motor disorder.    Review of Systems:  Constitutional: no fever, chills or change in weight   Eyes: no visual changes   ENT: no sore throat or dysphagia  Respiratory: no cough or shortness of breath   Cardiovascular: no chest pain or palpitations   Gastrointestinal: as per HPI  Hematologic/Lymphatic: no easy bruising or lymphadenopathy   Musculoskeletal: no arthralgias or myalgias   Neurological: no change in mental status  Behavioral/Psych: no change in mood      Past Medical History:   Diagnosis Date    Asthma     Diabetes mellitus     Hypertension     Migraine headache     Stroke 2016    Thyroid disease        Past Surgical History:   Procedure Laterality Date    APPENDECTOMY       SECTION      CHOLECYSTECTOMY      COLONOSCOPY N/A 2018    Procedure: COLONOSCOPY;  Surgeon: Lorenzo Howard MD;  Location: [address]);  Service: Endoscopy;  Laterality: N/A;  No PM Prep;  Indonesian speaking; daughter who can translate will be with her    COLONOSCOPY N/A 2018    Performed by Lorenzo Howard MD at [facility] ([address] [place] [address])    EGD (ESOPHAGOGASTRODUODENOSCOPY) N/A 2018    Performed by David Morgan MD at [address])    ESOPHAGOGASTRODUODENOSCOPY N/A 2018    Procedure: ESOPHAGOGASTRODUODENOSCOPY (EGD);  Surgeon: Lorenzo Howard MD;  Location: [address]);  Service: Endoscopy;  Laterality: N/A;    ESOPHAGOGASTRODUODENOSCOPY N/A  9/7/2018    Procedure: EGD (ESOPHAGOGASTRODUODENOSCOPY);  Surgeon: David Morgan MD;  Location: [facility] (4TH FLR);  Service: Endoscopy;  Laterality: N/A;  EGD with botox of LES with Dr. Morgan    ESOPHAGOGASTRODUODENOSCOPY (EGD) N/A 6/1/2018    Performed by Lorenzo Howard MD at [facility] (4TH FLR)    ESOPHAGOGASTRODUODENOSCOPY (EGD) N/A 2/4/2018    Performed by Lorenzo Howard MD at [facility] (2ND FLR)    HYSTERECTOMY      MANOMETRY-ESOPHAGEAL-WITH IMPEDANCE N/A 4/17/2018    Performed by Joel Cole MD at [facility] (4TH FLR)    UMBILICAL HERNIA REPAIR         Family History   Problem Relation Age of Onset    Hepatitis Mother     Cancer Mother     Hepatitis Father     Cancer Father     Colon polyps Neg Hx     Colon cancer Neg Hx     Esophageal cancer Neg Hx        Social History     Socioeconomic History    Marital status:      Spouse name: Not on file    Number of children: Not on file    Years of education: Not on file    Highest education level: Not on file   Social Needs    Financial resource strain: Not on file    Food insecurity - worry: Not on file    Food insecurity - inability: Not on file    Transportation needs - medical: Not on file    Transportation needs - non-medical: Not on file   Occupational History    Not on file   Tobacco Use    Smoking status: Never Smoker    Smokeless tobacco: Never Used   Substance and Sexual Activity    Alcohol use: No    Drug use: No    Sexual activity: No   Other Topics Concern    Not on file   Social History Narrative    Not on file       Current Outpatient Medications on File Prior to Visit   Medication Sig Dispense Refill    calcium carbonate (TUMS) 200 mg calcium (500 mg) chewable tablet Take 1 tablet by mouth once daily.      citalopram (CELEXA) 40 MG tablet Take 40 mg by mouth once daily.      fluticasone (FLONASE) 50 mcg/actuation nasal spray 1 spray by Each Nare route once daily.      glyBURIDE (DIABETA) 5 MG  "tablet Take 5 mg by mouth daily with breakfast.      hyoscyamine (LEVSIN/SL) 0.125 mg Subl Place 2 tablets (0.25 mg total) under the tongue every 4 (four) hours as needed. 30 tablet 3    insulin aspart protamine-insulin aspart (NOVOLOG 70/30) 100 unit/mL (70-30) InPn pen Inject into the skin 2 (two) times daily before meals.      levothyroxine (SYNTHROID) 25 MCG tablet Take 25 mcg by mouth once daily.      lisinopril-hydrochlorothiazide (PRINZIDE,ZESTORETIC) 10-12.5 mg per tablet Take 1 tablet by mouth once daily.      pantoprazole (PROTONIX) 40 MG tablet Take 1 tablet (40 mg total) by mouth once daily. 30 tablet 12    pravastatin (PRAVACHOL) 10 MG tablet Take 10 mg by mouth once daily.       No current facility-administered medications on file prior to visit.        Review of patient's allergies indicates:   Allergen Reactions    Diazepam Hallucinations       Physical Exam:  /71   Pulse 86   Ht 5' 6" (1.676 m)   Wt 101.3 kg (223 lb 5.2 oz)   BMI 36.05 kg/m²   General appearance: alert, appears stated age and cooperative  Head: Normocephalic, without obvious abnormality, atraumatic  Back: symmetric, no curvature. ROM normal. No CVA tenderness.  Lungs: clear to auscultation bilaterally  Chest wall: no tenderness  Heart: regular rate and rhythm, S1, S2 normal, no murmur, click, rub or gallop  Abdomen: soft, non-tender; bowel sounds normal; no masses,  no organomegaly  Extremities: extremities normal, atraumatic, no cyanosis or edema  Pulses: 2+ and symmetric    Laboratory:  Lab Results   Component Value Date    WBC 5.67 09/07/2018    HGB 15.6 09/07/2018    HCT 46.8 09/07/2018    MCV 83 09/07/2018     09/07/2018     CMP  Sodium   Date Value Ref Range Status   09/07/2018 136 136 - 145 mmol/L Final     Potassium   Date Value Ref Range Status   09/07/2018 4.8 3.5 - 5.1 mmol/L Final     Comment:     *Result may be interfered by visible hemolysis     Chloride   Date Value Ref Range Status "   09/07/2018 102 95 - 110 mmol/L Final     CO2   Date Value Ref Range Status   09/07/2018 25 23 - 29 mmol/L Final     Glucose   Date Value Ref Range Status   09/07/2018 203 (H) 70 - 110 mg/dL Final     BUN, Bld   Date Value Ref Range Status   09/07/2018 11 6 - 20 mg/dL Final     Creatinine   Date Value Ref Range Status   09/07/2018 0.9 0.5 - 1.4 mg/dL Final     Calcium   Date Value Ref Range Status   09/07/2018 10.2 8.7 - 10.5 mg/dL Final     Total Protein   Date Value Ref Range Status   09/07/2018 8.7 (H) 6.0 - 8.4 g/dL Final     Comment:     *Result may be interfered by visible hemolysis     Albumin   Date Value Ref Range Status   09/07/2018 3.9 3.5 - 5.2 g/dL Final     Total Bilirubin   Date Value Ref Range Status   09/07/2018 0.6 0.1 - 1.0 mg/dL Final     Comment:     For infants and newborns, interpretation of results should be based  on gestational age, weight and in agreement with clinical  observations.  Premature Infant recommended reference ranges:  Up to 24 hours.............<8.0 mg/dL  Up to 48 hours............<12.0 mg/dL  3-5 days..................<15.0 mg/dL  6-29 days.................<15.0 mg/dL       Alkaline Phosphatase   Date Value Ref Range Status   09/07/2018 185 (H) 55 - 135 U/L Final     AST   Date Value Ref Range Status   09/07/2018 64 (H) 10 - 40 U/L Final     Comment:     *Result may be interfered by visible hemolysis     ALT   Date Value Ref Range Status   09/07/2018 84 (H) 10 - 44 U/L Final     Anion Gap   Date Value Ref Range Status   09/07/2018 9 8 - 16 mmol/L Final     eGFR if    Date Value Ref Range Status   09/07/2018 >60.0 >60 mL/min/1.73 m^2 Final     eGFR if non    Date Value Ref Range Status   09/07/2018 >60.0 >60 mL/min/1.73 m^2 Final     Comment:     Calculation used to obtain the estimated glomerular filtration  rate (eGFR) is the CKD-EPI equation.          Assessment:  53 year old female with a history of HTN, HLD, Hypothyroidism, DM, Dysphagia  who presents for a follow up.     Plan:  Worsening esophageal dysphagia   When we initially met patient in the ED in February at which point she complained of ongoing issues for 3+ years which more than 15 dilation and no true improvement. She had an EGD that same evening which showed a small hiatal hernia with a torturous esophagus and an esophagram the following day she showed 3 contractions. This 2 studies were followed by a manometry which was remarkable for a hypercontractile esophagus. At this point she was still on a PPI/H2 blocker and her Levsin was increase (initially with some response but now its seems that it truly isn't working). She then had a repeat EGD with dilation up to 18 mm (no true improvement) and a colonoscopy. It was during this studies that there is mention of suspicion for achalasia. With this new suspicion all her studies including her manometry were re-examine, however we did not feel her clinical picture was that of classic achalasia but rather more of a non-specific hypercontractile disorder. We considered CCB however she was on multiple agents and we felt it would be difficult to alter her medications.    After her last visit we proceed with repeat EGD with botox. This provided relief for a limited about of time and now she is back to a liquid diet with significant daily symptoms. We still don't have a clear cut diagnosis but patient appears more symptomatic and therefore will proceed with repeat manometry once again reevaluate her swallowing (?Recheck lower esophageal sphincter pressure). Based on results may consider surgery evaluation vs presenting her case at swallow conference  --Decrease PPI to QDaily   --D/C H2 blocker  --Continue using Levsin as needed (not truly working but wants the medication just in case)  --Repeat manometry possible referral to Surgery pending results vs presenting her case as swallow conference  --Follow up in 3 months or sooner if issues arise with   [place] as patient is Faroese speaking, as results return from workup will make sure to keep patient updated via phone.     Colon cancer screening  --Repeat colonoscopy in 5 years based on history of polyps, due in 2023     Hypertension, T2DM, Hypothyroidism, Asthma  --Patient now following at [facility] with Rajeev Cline-NP      Orders Placed This Encounter   Procedures    Manometry esophageal       Rafia Magana M.D.  Gastroenterology Fellow, PGY-V  Pager: [phone]  [facility]

## 2018-11-01 ENCOUNTER — HOSPITAL ENCOUNTER (OUTPATIENT)
Facility: HOSPITAL | Age: 53
Discharge: HOME | End: 2018-11-01
Attending: INTERNAL MEDICINE | Admitting: INTERNAL MEDICINE
Payer: COMMERCIAL

## 2018-11-01 VITALS
SYSTOLIC BLOOD PRESSURE: 150 MMHG | HEART RATE: 81 BPM | TEMPERATURE: 98 F | DIASTOLIC BLOOD PRESSURE: 84 MMHG | RESPIRATION RATE: 16 BRPM | OXYGEN SATURATION: 97 %

## 2018-11-01 DIAGNOSIS — R13.10 DYSPHAGIA: ICD-10-CM

## 2018-11-01 LAB — POCT GLUCOSE: 206 MG/DL (ref 70–110)

## 2018-11-01 PROCEDURE — 25000003 PHARM REV CODE 250: Performed by: INTERNAL MEDICINE

## 2018-11-01 PROCEDURE — 91010 ESOPHAGUS MOTILITY STUDY: CPT | Mod: 26,,, | Performed by: INTERNAL MEDICINE

## 2018-11-01 PROCEDURE — 82962 GLUCOSE BLOOD TEST: CPT | Performed by: INTERNAL MEDICINE

## 2018-11-01 PROCEDURE — 91037 ESOPH IMPED FUNCTION TEST: CPT | Performed by: INTERNAL MEDICINE

## 2018-11-01 PROCEDURE — 91037 ESOPH IMPED FUNCTION TEST: CPT | Mod: 26,,, | Performed by: INTERNAL MEDICINE

## 2018-11-01 PROCEDURE — 91010 ESOPHAGUS MOTILITY STUDY: CPT | Performed by: INTERNAL MEDICINE

## 2018-11-01 RX ORDER — LIDOCAINE HYDROCHLORIDE 20 MG/ML
JELLY TOPICAL ONCE
Status: COMPLETED | OUTPATIENT
Start: 2018-11-01 | End: 2018-11-01

## 2018-11-01 RX ADMIN — LIDOCAINE HYDROCHLORIDE 10 ML: 20 JELLY TOPICAL at 08:11

## 2018-11-05 ENCOUNTER — PATIENT MESSAGE (OUTPATIENT)
Dept: GASTROENTEROLOGY | Facility: CLINIC | Age: 53
End: 2018-11-05

## 2018-11-06 ENCOUNTER — TELEPHONE (OUTPATIENT)
Dept: GASTROENTEROLOGY | Facility: CLINIC | Age: 53
End: 2018-11-06

## 2018-11-06 NOTE — TELEPHONE ENCOUNTER
Dr. Magana,  I spoke with Shakila and she stated that we take [employer] insurance . The patient can get an appointment as early as 11/8  Please advise

## 2018-11-06 NOTE — TELEPHONE ENCOUNTER
----- Message from Rafia Magana MD sent at 11/6/2018  3:39 PM CST -----  Vasyl Carpenter    Based on her insurance can she see Internal Medicine here? If so how quickly can we get her in?    I am going to call her and make some medication adjustments for her esophageal spasms which will affect her BP so I would prefer to have her see an MD here that I can easily communicate with.    Thanks for always helping me  Let me know    Dr. LARA

## 2018-11-07 ENCOUNTER — TELEPHONE (OUTPATIENT)
Dept: GASTROENTEROLOGY | Facility: CLINIC | Age: 53
End: 2018-11-07

## 2018-11-07 ENCOUNTER — PATIENT MESSAGE (OUTPATIENT)
Dept: GASTROENTEROLOGY | Facility: CLINIC | Age: 53
End: 2018-11-07

## 2018-11-07 NOTE — TELEPHONE ENCOUNTER
11/07/2018  2:55 PM    Overall difficult case as she has had symptoms for years but feels that the frequency and intensity has worsened in the last 1 year. Repeat manometry does not show significantly elevated LES pressures to justify a surgical consult/myotomy however there is a non-specific swallowing disorder.  At this point we want to go ahead and start a CCB.    We have called patient and discussed manometry results as well as plan moving forward. Have also gotten her an appointment with primary care in an effort to centralize her care/work together to titrate medications.    Recommendations:  --Establish care with IM-appointment on 11/9, we have sent patient a my patient portal reminder  --Start Diltiazem but will first discuss with PCP-we have sent MD who will see her on 11/9 a message for assistance in management  --Continue daily PPI and Levsin as needed  --Follow up with me to be determine, I will speak to her Friday after her PCP appointment    Rafia Magana M.D.  Gastroenterology Fellow, PGY-V  Pager: [phone]  [facility]

## 2018-11-13 ENCOUNTER — PATIENT MESSAGE (OUTPATIENT)
Dept: HEPATOLOGY | Facility: HOSPITAL | Age: 53
End: 2018-11-13

## 2018-11-21 ENCOUNTER — TELEPHONE (OUTPATIENT)
Dept: GASTROENTEROLOGY | Facility: CLINIC | Age: 53
End: 2018-11-21

## 2018-11-21 NOTE — TELEPHONE ENCOUNTER
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  I have a lot of success with trazodone   -Ref to Dr. Stuart or Chris if above fails    -Let me know if you want me to see the pt in motility clinic.   Feel free to ask me any quastions     Summary of Hypercontractile esophagus/KATIUSKA treatment   Avoid triggers: cold fluids, caffeine, smoking, ETOH  Soft foods and liquids as they trigger fewer symptoms  PPI BID     Nifedipine MR 10-20mg TID prn (B - 1 RCT)  Diltiazem MR 60mg TID prn (B - 1 RCT)  Side Effects: hypotension, bradycardia, edema    Glycerin trinitrate 300mg Sl TID prn (C)  Isosorbide dinitrate 10-40µg PO QDay or TID (C)  Short-acting or long-acting preparations  Side Effects: Dizziness, Headache    Peppermint oil 1 capsule TID or PRN (C)    Sildenafil 20mg PO TID prn (C)  Short and long acting preparation  Very expensive. Insurance may not cover  SE: dizziness, headache    Trazodone 50-150mg QHS  Superior to isosorbide dinitrate  Imipramine 25mg-100 QHS (C)  Amitriptyline 25mg-100 QHS  Sedation, dizziness, constipation, QTc prolongation    EGD with Botox  Myotomy if medical management fails

## 2018-11-27 ENCOUNTER — TELEPHONE (OUTPATIENT)
Dept: GASTROENTEROLOGY | Facility: CLINIC | Age: 53
End: 2018-11-27

## 2018-11-27 DIAGNOSIS — K21.9 GASTROESOPHAGEAL REFLUX DISEASE WITHOUT ESOPHAGITIS: ICD-10-CM

## 2018-11-27 DIAGNOSIS — K22.4 ESOPHAGEAL DYSMOTILITY: ICD-10-CM

## 2018-11-27 RX ORDER — DILTIAZEM HYDROCHLORIDE 30 MG/1
30 TABLET, FILM COATED ORAL DAILY
Qty: 30 TABLET | Refills: 0 | Status: SHIPPED | OUTPATIENT
Start: 2018-11-27 | End: 2018-12-27

## 2018-11-27 RX ORDER — PANTOPRAZOLE SODIUM 40 MG/1
40 TABLET, DELAYED RELEASE ORAL 2 TIMES DAILY
Qty: 30 TABLET | Refills: 3 | Status: SHIPPED | OUTPATIENT
Start: 2018-11-27

## 2018-11-27 NOTE — TELEPHONE ENCOUNTER
Treatment Plan  11/27/2018  1:58 PM    Called patient on 11/21 and 11/23 however was unable to speak to her.    I called her today again and was able to get through. She reports that int he last couple of weeks has had to relocate to [place]. She is unsure if she will stay in [place] as she would prefer to come back to [place] where she has found Persian speaking doctors.    She reports the same complaints and frustrations as before. I explained that we had her manometry read by our motility expert who thought study was consistent with Jackhammer esophagus. Based on these findings we will start Diltiazem at the lowest dose possible. Furthermore patient asked if her PPI could be increased back up to BID. I explained that I would be OK with this for now in hopes that between PPI/CCB her symptoms will improve.    Recommendations:  --Increase PPI to BID (limited supply prescribed to [facility] in [place] while patient finalizes her living situation)  --Start Diltiazem at 30 mg PO Qdaily (limited supply prescribed to [facility] in [place] while patient finalizes her living situation; patient educated about possible hypotension/edema related to medication; medication interaction done on UpToDate and no major adverse interactions with CCB; I asked her to check her BP daily once she starts the medication)  --She is to call me 7 days after starting CCB to see how she is doing and make sure her BP is within an acceptable range.      Rafia Magana M.D.  Gastroenterology Fellow, PGY-V  Pager: [phone]  [facility]

## 2018-12-04 ENCOUNTER — TELEPHONE (OUTPATIENT)
Dept: GASTROENTEROLOGY | Facility: CLINIC | Age: 53
End: 2018-12-04

## 2018-12-04 NOTE — TELEPHONE ENCOUNTER
Treatment Plan  12/04/2018  7:10 AM    Unable to speak to patient but did leave a voicemail with my contact information.      Rafia Magana M.D.  Gastroenterology Fellow, PGY-V  Pager: [phone]  [facility]

## 2018-12-04 NOTE — TELEPHONE ENCOUNTER
----- Message from Rafia Magana MD sent at 11/27/2018  5:20 PM CST -----  Call her to see if she go there medication and if so see how she is doing

## 2018-12-20 ENCOUNTER — TELEPHONE (OUTPATIENT)
Dept: GASTROENTEROLOGY | Facility: CLINIC | Age: 53
End: 2018-12-20

## 2018-12-20 NOTE — TELEPHONE ENCOUNTER
Treatment Plan  12/20/2018  4:25 PM    Called Mrs. Perez and she said that she was not able to tolerate diltiazem 30 mg PO QDaily because it caused her fatigue and low BP. At this point symptoms remain the same on PPI and Levsin. She is returning to [facility] early January and scheduled to see her PCP on January 3rd. I asked her to please give him my office number in hopes that we can talk and maybe figure out a better medication regimen that will control her BP/dysphagia. I would also like to see her in clinic but date to be determined based on relocation.    Rafia Magana M.D.  Gastroenterology Fellow, PGY-V  Pager: [phone]  [facility]

## 2019-01-08 DIAGNOSIS — K22.4 ESOPHAGEAL DYSMOTILITY: ICD-10-CM

## 2019-01-08 DIAGNOSIS — K21.9 GASTROESOPHAGEAL REFLUX DISEASE WITHOUT ESOPHAGITIS: ICD-10-CM

## 2019-01-09 RX ORDER — DILTIAZEM HYDROCHLORIDE 30 MG/1
30 TABLET, FILM COATED ORAL DAILY
Qty: 30 TABLET | Refills: 0 | OUTPATIENT
Start: 2019-01-09 | End: 2019-02-08

## 2019-01-11 NOTE — TELEPHONE ENCOUNTER
01/10/2019  6:33 PM    Medication refused as in prior conversation patient reported significant hypotension with this medication.  Called patient to discuss this decision but was unable to speak to her and therefore left her a detailed message.    Rafia Magana M.D.  Gastroenterology Fellow, PGY-V  Pager: [phone]  [facility]

## 2021-06-18 NOTE — DISCHARGE INSTRUCTIONS
Manometría esofágica     Un catéter medirá la presión a lo alyssa del esófago.     La manometría esofágica es geeta prueba en la que se mide la fuerza y el funcionamiento del esófago (el tubo que va de la garganta al estómago). Los resultados de esta prueba ayudan a identificar las causas de la acidez estomacal, los problemas al tragar y el dolor de pecho. La prueba también puede ayudar a planear la cirugía y a evaluar el éxito de geeta operación previa.  Preparativos para la prueba  Asegúrese de informar al médico de todos los medicamentos que esté tomando, ya que algunos pueden afectar los resultados de las pruebas. Asimismo, consúltele todas las preguntas que tenga acerca de los riesgos de la prueba, naseem la irritación de la nariz y de la garganta. Evite fumar, comer y beber megha las 12 horas anteriores a la prueba.  Megha la prueba  La manometría angy aproximadamente geeta hora. Normalmente el paciente está acostado megha la prueba. Le pondrán anestesia local en la nariz y en la garganta. A continuación le introducirán un tubo suave y lópez por la nariz y a través del esófago. Al principio, es posible que note geeta sensación de ganas de vomitar. Le pedirán que trague varias veces. Los orificios a lo alyssa del tubo miden la presión ejercida al tragar. Estas mediciones se registran gráficamente de manera similar al registro gráfico de geeta prueba cardíaca. Después de la prueba, es posible que le dejen un catéter en el esófago megha 24 horas para medir el nivel de acidez.  Después de la manometría esofágica  Es probable que programen otra visita para que el médico pueda explicarle los resultados de la prueba, ya que se necesita tiempo para analizar el registro gráfico. Es posible que tenga geeta ligera irritación de garganta megha un corto período. Villeda pronto naseem se le pase el efecto de la anestesia en la garganta, podrá reanudar quesada dieta y actividades normales.  Date Last Reviewed:   © 0910-7526 [employer]. [address] [place] [address], [place] [address]. Todos los derechos reservados. Esta información no pretende sustituir la atención médica profesional. Sólo quesada médico puede diagnosticar y tratar un problema de sherman.         17-Jun-2021 18:00